# Patient Record
Sex: MALE | Race: WHITE | NOT HISPANIC OR LATINO | URBAN - METROPOLITAN AREA
[De-identification: names, ages, dates, MRNs, and addresses within clinical notes are randomized per-mention and may not be internally consistent; named-entity substitution may affect disease eponyms.]

---

## 2017-02-01 ENCOUNTER — COMMUNICATION - HEALTHEAST (OUTPATIENT)
Dept: INTERNAL MEDICINE | Facility: CLINIC | Age: 72
End: 2017-02-01

## 2017-02-01 DIAGNOSIS — I25.10 CORONARY ATHEROSCLEROSIS: ICD-10-CM

## 2017-02-08 ENCOUNTER — OFFICE VISIT - HEALTHEAST (OUTPATIENT)
Dept: INTERNAL MEDICINE | Facility: CLINIC | Age: 72
End: 2017-02-08

## 2017-02-08 DIAGNOSIS — I77.9 BILATERAL CAROTID ARTERY DISEASE (H): ICD-10-CM

## 2017-02-08 DIAGNOSIS — Z23 NEED FOR PROPHYLACTIC VACCINATION AND INOCULATION AGAINST INFLUENZA: ICD-10-CM

## 2017-02-08 DIAGNOSIS — I25.10 CORONARY ARTERY DISEASE: ICD-10-CM

## 2017-02-08 DIAGNOSIS — Z12.2 ENCOUNTER FOR SCREENING FOR LUNG CANCER: ICD-10-CM

## 2017-02-08 DIAGNOSIS — Z71.89 ADVANCED CARE PLANNING/COUNSELING DISCUSSION: ICD-10-CM

## 2017-02-08 DIAGNOSIS — Z00.00 ROUTINE GENERAL MEDICAL EXAMINATION AT A HEALTH CARE FACILITY: ICD-10-CM

## 2017-02-08 DIAGNOSIS — Z23 IMMUNIZATION DUE: ICD-10-CM

## 2017-02-08 DIAGNOSIS — Z12.11 SCREENING FOR COLON CANCER: ICD-10-CM

## 2017-02-08 DIAGNOSIS — R79.9 ABNORMAL FINDING OF BLOOD CHEMISTRY: ICD-10-CM

## 2017-02-08 DIAGNOSIS — R01.1 SYSTOLIC MURMUR: ICD-10-CM

## 2017-02-08 DIAGNOSIS — F17.211 CIGARETTE NICOTINE DEPENDENCE IN REMISSION: ICD-10-CM

## 2017-02-08 DIAGNOSIS — Z82.79 FAMILY HISTORY OF BICUSPID AORTIC VALVE: ICD-10-CM

## 2017-02-08 DIAGNOSIS — I65.23 OCCLUSION AND STENOSIS OF BILATERAL CAROTID ARTERIES: ICD-10-CM

## 2017-02-08 DIAGNOSIS — Z12.5 SCREENING FOR PROSTATE CANCER: ICD-10-CM

## 2017-02-08 DIAGNOSIS — Z13.6 SCREENING FOR ABDOMINAL AORTIC ANEURYSM: ICD-10-CM

## 2017-02-08 DIAGNOSIS — R07.9 CHEST PAIN: ICD-10-CM

## 2017-02-08 LAB
CHOLEST SERPL-MCNC: 165 MG/DL
FASTING STATUS PATIENT QL REPORTED: YES
HBA1C MFR BLD: 5.3 % (ref 3.5–6)
HDLC SERPL-MCNC: 52 MG/DL
LDLC SERPL CALC-MCNC: 96 MG/DL
PSA SERPL-MCNC: 1.8 NG/ML (ref 0–6.5)
TRIGL SERPL-MCNC: 84 MG/DL

## 2017-02-08 ASSESSMENT — MIFFLIN-ST. JEOR: SCORE: 1819.01

## 2017-02-09 ENCOUNTER — COMMUNICATION - HEALTHEAST (OUTPATIENT)
Dept: INTERNAL MEDICINE | Facility: CLINIC | Age: 72
End: 2017-02-09

## 2017-02-13 ENCOUNTER — HOSPITAL ENCOUNTER (OUTPATIENT)
Dept: ULTRASOUND IMAGING | Facility: CLINIC | Age: 72
Discharge: HOME | End: 2017-02-13
Attending: INTERNAL MEDICINE

## 2017-02-13 DIAGNOSIS — I65.23 OCCLUSION AND STENOSIS OF BILATERAL CAROTID ARTERIES: ICD-10-CM

## 2017-02-13 DIAGNOSIS — Z13.6 SCREENING FOR ABDOMINAL AORTIC ANEURYSM: ICD-10-CM

## 2017-02-13 DIAGNOSIS — I77.9 BILATERAL CAROTID ARTERY DISEASE (H): ICD-10-CM

## 2017-02-14 ENCOUNTER — COMMUNICATION - HEALTHEAST (OUTPATIENT)
Dept: INTERNAL MEDICINE | Facility: CLINIC | Age: 72
End: 2017-02-14

## 2017-03-02 ENCOUNTER — HOSPITAL ENCOUNTER (OUTPATIENT)
Dept: CARDIOLOGY | Facility: CLINIC | Age: 72
Discharge: HOME | End: 2017-03-02
Attending: INTERNAL MEDICINE

## 2017-03-02 DIAGNOSIS — R07.9 CHEST PAIN: ICD-10-CM

## 2017-03-02 DIAGNOSIS — Z82.79 FAMILY HISTORY OF BICUSPID AORTIC VALVE: ICD-10-CM

## 2017-03-02 DIAGNOSIS — R01.1 SYSTOLIC MURMUR: ICD-10-CM

## 2017-03-02 LAB
AORTIC ROOT: 3.2 CM
AORTIC VALVE MEAN VELOCITY: 134 CM/S
AV DIMENSIONLESS INDEX VTI: 0.8
AV MEAN GRADIENT: 8 MMHG
AV PEAK GRADIENT: 15.8 MMHG
AV VALVE AREA: 2.7 CM2
AV VELOCITY RATIO: 0.7
BSA FOR ECHO PROCEDURE: 2.3 M2
CV BLOOD PRESSURE: NORMAL MMHG
CV ECHO HEIGHT: 73 IN
CV ECHO WEIGHT: 226 LBS
CV STRESS CURRENT BP HE: NORMAL
CV STRESS CURRENT HR HE: 100
CV STRESS CURRENT HR HE: 102
CV STRESS CURRENT HR HE: 103
CV STRESS CURRENT HR HE: 105
CV STRESS CURRENT HR HE: 117
CV STRESS CURRENT HR HE: 122
CV STRESS CURRENT HR HE: 128
CV STRESS CURRENT HR HE: 131
CV STRESS CURRENT HR HE: 137
CV STRESS CURRENT HR HE: 137
CV STRESS CURRENT HR HE: 140
CV STRESS CURRENT HR HE: 141
CV STRESS CURRENT HR HE: 141
CV STRESS CURRENT HR HE: 48
CV STRESS CURRENT HR HE: 53
CV STRESS CURRENT HR HE: 63
CV STRESS CURRENT HR HE: 63
CV STRESS CURRENT HR HE: 64
CV STRESS CURRENT HR HE: 68
CV STRESS CURRENT HR HE: 69
CV STRESS CURRENT HR HE: 70
CV STRESS CURRENT HR HE: 71
CV STRESS CURRENT HR HE: 75
CV STRESS CURRENT HR HE: 77
CV STRESS CURRENT HR HE: 78
CV STRESS CURRENT HR HE: 79
CV STRESS CURRENT HR HE: 83
CV STRESS CURRENT HR HE: 90
CV STRESS CURRENT HR HE: 92
CV STRESS DEVIATION TIME HE: NORMAL
CV STRESS ECHO PERCENT HR HE: NORMAL
CV STRESS EXERCISE STAGE HE: NORMAL
CV STRESS FINAL RESTING BP HE: NORMAL
CV STRESS FINAL RESTING HR HE: 63
CV STRESS MAX HR HE: 142
CV STRESS MAX HR HE: 150
CV STRESS MAX TREADMILL GRADE HE: 0
CV STRESS MAX TREADMILL SPEED HE: 0
CV STRESS PEAK DIA BP HE: NORMAL
CV STRESS PEAK SYS BP HE: NORMAL
CV STRESS PHASE HE: NORMAL
CV STRESS PROTOCOL HE: NORMAL
CV STRESS RESTING PT POSITION HE: NORMAL
CV STRESS RESTING PT POSITION HE: NORMAL
CV STRESS ST DEVIATION AMOUNT HE: NORMAL
CV STRESS ST DEVIATION ELEVATION HE: NORMAL
CV STRESS ST EVELATION AMOUNT HE: NORMAL
CV STRESS TEST TYPE HE: NORMAL
CV STRESS TOTAL STAGE TIME MIN 1 HE: NORMAL
DOP CALC AO PEAK VEL: 199 CM/S
DOP CALC AO VTI: 44.5 CM
DOP CALC LVOT AREA: 3.46 CM2
DOP CALC LVOT DIAMETER: 2.1 CM
DOP CALC LVOT PEAK VEL: 149 CM/S
DOP CALC LVOT STROKE VOLUME: 122.2 CM3
DOP CALCLVOT PEAK VEL VTI: 35.3 CM
EJECTION FRACTION: 60 % (ref 55–75)
LA AREA 1: 20.5 CM2
LA AREA 2: 13.4 CM2
LEFT ATRIUM LENGTH: 4.51 CM
LEFT ATRIUM VOLUME INDEX: 22.5 ML/M2
LEFT ATRIUM VOLUME: 51.8 CM3
LEFT VENTRICLE CARDIAC INDEX: 2.8 L/MIN/M2
LEFT VENTRICLE CARDIAC OUTPUT: 6.4 L/MIN
LEFT VENTRICLE DIASTOLIC VOLUME INDEX: 38.3 CM3/M2 (ref 34–74)
LEFT VENTRICLE DIASTOLIC VOLUME: 88 CM3 (ref 62–150)
LEFT VENTRICLE HEART RATE: 52 BPM
LEFT VENTRICLE SYSTOLIC VOLUME INDEX: 15.2 CM3/M2 (ref 11–31)
LEFT VENTRICLE SYSTOLIC VOLUME: 35 CM3 (ref 21–61)
LEFT VENTRICULAR OUTFLOW TRACT MEAN GRADIENT: 5 MMHG
LEFT VENTRICULAR OUTFLOW TRACT MEAN VELOCITY: 103 CM/S
LEFT VENTRICULAR OUTFLOW TRACT PEAK GRADIENT: 9 MMHG
LV STROKE VOLUME INDEX: 53.1 ML/M2
MITRAL VALVE E/A RATIO: 0.8
MV AVERAGE E/E' RATIO: 11.1 CM/S
MV DECELERATION TIME: 246 MS
MV E'TISSUE VEL-LAT: 9.46 CM/S
MV E'TISSUE VEL-MED: 5.36 CM/S
MV LATERAL E/E' RATIO: 8.7
MV MEDIAL E/E' RATIO: 15.3
MV PEAK A VELOCITY: 106 CM/S
MV PEAK E VELOCITY: 82.1 CM/S
NUC REST DIASTOLIC VOLUME INDEX: 3616 LBS
NUC REST SYSTOLIC VOLUME INDEX: 73 IN
STRESS ECHO BASELINE BP: NORMAL
STRESS ECHO BASELINE BP: NORMAL M/S
STRESS ECHO BASELINE HR: 48
STRESS ECHO BASELINE HR: 48 BPM
STRESS ECHO CALCULATED PERCENT HR: 100 %
STRESS ECHO CALCULATED PERCENT HR: 95 %
STRESS ECHO LAST STRESS BP: NORMAL
STRESS ECHO LAST STRESS BP: NORMAL M/S
STRESS ECHO LAST STRESS HR: 140
STRESS ECHO POST ESTIMATED WORKLOAD: 10.3
STRESS ECHO POST ESTIMATED WORKLOAD: 10.3 METS
STRESS ECHO POST EXERCISE DUR MIN: 9
STRESS ECHO POST EXERCISE DUR MIN: 9 MIN
STRESS ECHO POST EXERCISE DUR SEC: 0
STRESS ECHO TARGET HR: 128
STRESS ECHO TARGET HR: 128
TRICUSPID REGURGITATION PEAK PRESSURE GRADIENT: 23.6 MMHG
TRICUSPID VALVE ANULAR PLANE SYSTOLIC EXCURSION: 2.1 CM
TRICUSPID VALVE PEAK REGURGITANT VELOCITY: 243 CM/S

## 2017-03-02 ASSESSMENT — MIFFLIN-ST. JEOR: SCORE: 1819.01

## 2017-03-03 ENCOUNTER — COMMUNICATION - HEALTHEAST (OUTPATIENT)
Dept: INTERNAL MEDICINE | Facility: CLINIC | Age: 72
End: 2017-03-03

## 2017-04-18 ENCOUNTER — OFFICE VISIT - HEALTHEAST (OUTPATIENT)
Dept: CARDIOLOGY | Facility: CLINIC | Age: 72
End: 2017-04-18

## 2017-04-18 DIAGNOSIS — I25.10 ATHEROSCLEROSIS OF NATIVE CORONARY ARTERY OF NATIVE HEART WITHOUT ANGINA PECTORIS: ICD-10-CM

## 2017-04-18 DIAGNOSIS — E78.5 HYPERLIPEMIA: ICD-10-CM

## 2017-04-18 DIAGNOSIS — G47.33 OSA ON CPAP: ICD-10-CM

## 2017-04-18 ASSESSMENT — MIFFLIN-ST. JEOR: SCORE: 1809.94

## 2017-04-20 ENCOUNTER — COMMUNICATION - HEALTHEAST (OUTPATIENT)
Dept: INTERNAL MEDICINE | Facility: CLINIC | Age: 72
End: 2017-04-20

## 2017-04-20 DIAGNOSIS — I25.10 CORONARY ATHEROSCLEROSIS: ICD-10-CM

## 2017-08-17 ENCOUNTER — COMMUNICATION - HEALTHEAST (OUTPATIENT)
Dept: INTERNAL MEDICINE | Facility: CLINIC | Age: 72
End: 2017-08-17

## 2017-08-17 DIAGNOSIS — I25.10 CORONARY ATHEROSCLEROSIS: ICD-10-CM

## 2017-09-27 ENCOUNTER — HOSPITAL ENCOUNTER (OUTPATIENT)
Dept: LAB | Age: 72
Setting detail: SPECIMEN
Discharge: HOME | End: 2017-09-27

## 2017-09-27 ENCOUNTER — AMBULATORY - HEALTHEAST (OUTPATIENT)
Dept: CARDIOLOGY | Facility: CLINIC | Age: 72
End: 2017-09-27

## 2017-09-27 DIAGNOSIS — I25.10 ATHEROSCLEROSIS OF NATIVE CORONARY ARTERY OF NATIVE HEART WITHOUT ANGINA PECTORIS: ICD-10-CM

## 2017-09-27 LAB
CHOLEST SERPL-MCNC: 173 MG/DL
FASTING STATUS PATIENT QL REPORTED: YES
HDLC SERPL-MCNC: 49 MG/DL
LDLC SERPL CALC-MCNC: 99 MG/DL
TRIGL SERPL-MCNC: 124 MG/DL

## 2018-02-21 ENCOUNTER — COMMUNICATION - HEALTHEAST (OUTPATIENT)
Dept: CARDIOLOGY | Facility: CLINIC | Age: 73
End: 2018-02-21

## 2018-02-21 DIAGNOSIS — E78.5 HYPERLIPEMIA: ICD-10-CM

## 2018-02-22 ENCOUNTER — COMMUNICATION - HEALTHEAST (OUTPATIENT)
Dept: CARDIOLOGY | Facility: CLINIC | Age: 73
End: 2018-02-22

## 2018-02-22 DIAGNOSIS — I25.10 CAD (CORONARY ARTERY DISEASE): ICD-10-CM

## 2018-04-16 ENCOUNTER — COMMUNICATION - HEALTHEAST (OUTPATIENT)
Dept: CARDIOLOGY | Facility: CLINIC | Age: 73
End: 2018-04-16

## 2018-04-16 DIAGNOSIS — I25.10 CAD (CORONARY ARTERY DISEASE): ICD-10-CM

## 2018-05-04 ENCOUNTER — COMMUNICATION - HEALTHEAST (OUTPATIENT)
Dept: ADMINISTRATIVE | Facility: CLINIC | Age: 73
End: 2018-05-04

## 2018-09-18 ENCOUNTER — COMMUNICATION - HEALTHEAST (OUTPATIENT)
Dept: TELEHEALTH | Facility: CLINIC | Age: 73
End: 2018-09-18

## 2018-09-19 ENCOUNTER — OFFICE VISIT - HEALTHEAST (OUTPATIENT)
Dept: CARDIOLOGY | Facility: TELEHEALTH | Age: 73
End: 2018-09-19

## 2018-09-19 DIAGNOSIS — G47.33 OSA ON CPAP: ICD-10-CM

## 2018-09-19 DIAGNOSIS — I25.10 ATHEROSCLEROSIS OF NATIVE CORONARY ARTERY OF NATIVE HEART WITHOUT ANGINA PECTORIS: ICD-10-CM

## 2018-09-19 ASSESSMENT — MIFFLIN-ST. JEOR: SCORE: 1800.87

## 2018-10-11 ENCOUNTER — COMMUNICATION - HEALTHEAST (OUTPATIENT)
Dept: INTERNAL MEDICINE | Facility: CLINIC | Age: 73
End: 2018-10-11

## 2018-10-11 DIAGNOSIS — I25.10 CORONARY ATHEROSCLEROSIS: ICD-10-CM

## 2018-11-20 ENCOUNTER — COMMUNICATION - HEALTHEAST (OUTPATIENT)
Dept: CARDIOLOGY | Facility: CLINIC | Age: 73
End: 2018-11-20

## 2018-11-20 DIAGNOSIS — I25.10 CAD (CORONARY ARTERY DISEASE): ICD-10-CM

## 2019-05-19 ENCOUNTER — COMMUNICATION - HEALTHEAST (OUTPATIENT)
Dept: CARDIOLOGY | Facility: CLINIC | Age: 74
End: 2019-05-19

## 2019-05-19 DIAGNOSIS — I25.10 CAD (CORONARY ARTERY DISEASE): ICD-10-CM

## 2019-05-21 ENCOUNTER — OFFICE VISIT - HEALTHEAST (OUTPATIENT)
Dept: INTERNAL MEDICINE | Facility: CLINIC | Age: 74
End: 2019-05-21

## 2019-05-21 DIAGNOSIS — R07.9 CHEST PAIN: ICD-10-CM

## 2019-05-21 DIAGNOSIS — Z00.00 ROUTINE GENERAL MEDICAL EXAMINATION AT A HEALTH CARE FACILITY: ICD-10-CM

## 2019-05-21 DIAGNOSIS — I25.10 ATHEROSCLEROSIS OF NATIVE CORONARY ARTERY OF NATIVE HEART WITHOUT ANGINA PECTORIS: ICD-10-CM

## 2019-05-21 DIAGNOSIS — K21.9 GASTROESOPHAGEAL REFLUX DISEASE WITHOUT ESOPHAGITIS: ICD-10-CM

## 2019-05-21 DIAGNOSIS — R42 POSTURAL LIGHTHEADEDNESS: ICD-10-CM

## 2019-05-21 DIAGNOSIS — G47.33 OSA ON CPAP: ICD-10-CM

## 2019-05-21 DIAGNOSIS — G62.9 POLYNEUROPATHY: ICD-10-CM

## 2019-05-21 DIAGNOSIS — E78.5 HYPERLIPIDEMIA, UNSPECIFIED HYPERLIPIDEMIA TYPE: ICD-10-CM

## 2019-05-21 DIAGNOSIS — Z71.89 ADVANCED CARE PLANNING/COUNSELING DISCUSSION: ICD-10-CM

## 2019-05-21 DIAGNOSIS — Z13.1 SCREENING FOR DIABETES MELLITUS: ICD-10-CM

## 2019-05-21 DIAGNOSIS — I77.9 BILATERAL CAROTID ARTERY DISEASE, UNSPECIFIED TYPE (H): ICD-10-CM

## 2019-05-21 DIAGNOSIS — R29.2 HYPERREFLEXIA: ICD-10-CM

## 2019-05-21 DIAGNOSIS — R26.89 POOR BALANCE: ICD-10-CM

## 2019-05-21 DIAGNOSIS — G47.10 EXCESSIVE SLEEPINESS: ICD-10-CM

## 2019-05-21 DIAGNOSIS — Z12.11 SCREEN FOR COLON CANCER: ICD-10-CM

## 2019-05-21 DIAGNOSIS — Z12.5 SCREENING FOR PROSTATE CANCER: ICD-10-CM

## 2019-05-21 LAB
ALBUMIN SERPL-MCNC: 3.7 G/DL (ref 3.5–5)
ALBUMIN UR-MCNC: ABNORMAL MG/DL
ALP SERPL-CCNC: 98 U/L (ref 45–120)
ALT SERPL W P-5'-P-CCNC: 27 U/L (ref 0–45)
ANION GAP SERPL CALCULATED.3IONS-SCNC: 9 MMOL/L (ref 5–18)
APPEARANCE UR: CLEAR
AST SERPL W P-5'-P-CCNC: 22 U/L (ref 0–40)
ATRIAL RATE - MUSE: 46 BPM
BACTERIA #/AREA URNS HPF: ABNORMAL HPF
BILIRUB SERPL-MCNC: 0.9 MG/DL (ref 0–1)
BILIRUB UR QL STRIP: NEGATIVE
BUN SERPL-MCNC: 21 MG/DL (ref 8–28)
CALCIUM SERPL-MCNC: 9.9 MG/DL (ref 8.5–10.5)
CHLORIDE BLD-SCNC: 108 MMOL/L (ref 98–107)
CHOLEST SERPL-MCNC: 139 MG/DL
CO2 SERPL-SCNC: 24 MMOL/L (ref 22–31)
COLOR UR AUTO: YELLOW
CREAT SERPL-MCNC: 0.8 MG/DL (ref 0.7–1.3)
DIASTOLIC BLOOD PRESSURE - MUSE: NORMAL MMHG
ERYTHROCYTE [DISTWIDTH] IN BLOOD BY AUTOMATED COUNT: 13 % (ref 11–14.5)
FASTING STATUS PATIENT QL REPORTED: YES
GFR SERPL CREATININE-BSD FRML MDRD: >60 ML/MIN/1.73M2
GLUCOSE BLD-MCNC: 88 MG/DL (ref 70–125)
GLUCOSE UR STRIP-MCNC: NEGATIVE MG/DL
HCT VFR BLD AUTO: 46 % (ref 40–54)
HDLC SERPL-MCNC: 50 MG/DL
HGB BLD-MCNC: 15.5 G/DL (ref 14–18)
HGB UR QL STRIP: ABNORMAL
INTERPRETATION ECG - MUSE: NORMAL
KETONES UR STRIP-MCNC: NEGATIVE MG/DL
LDLC SERPL CALC-MCNC: 74 MG/DL
LEUKOCYTE ESTERASE UR QL STRIP: NEGATIVE
MCH RBC QN AUTO: 30.3 PG (ref 27–34)
MCHC RBC AUTO-ENTMCNC: 33.6 G/DL (ref 32–36)
MCV RBC AUTO: 90 FL (ref 80–100)
MUCOUS THREADS #/AREA URNS LPF: ABNORMAL LPF
NITRATE UR QL: NEGATIVE
P AXIS - MUSE: 72 DEGREES
PH UR STRIP: 5.5 [PH] (ref 4.5–8)
PLATELET # BLD AUTO: 195 THOU/UL (ref 140–440)
PMV BLD AUTO: 8.2 FL (ref 7–10)
POTASSIUM BLD-SCNC: 4.4 MMOL/L (ref 3.5–5)
PR INTERVAL - MUSE: 178 MS
PROT SERPL-MCNC: 6.6 G/DL (ref 6–8)
PSA SERPL-MCNC: 1.4 NG/ML (ref 0–6.5)
QRS DURATION - MUSE: 80 MS
QT - MUSE: 474 MS
QTC - MUSE: 414 MS
R AXIS - MUSE: -6 DEGREES
RBC # BLD AUTO: 5.1 MILL/UL (ref 4.4–6.2)
RBC #/AREA URNS AUTO: ABNORMAL HPF
SODIUM SERPL-SCNC: 141 MMOL/L (ref 136–145)
SP GR UR STRIP: 1.03 (ref 1–1.03)
SQUAMOUS #/AREA URNS AUTO: ABNORMAL LPF
SYSTOLIC BLOOD PRESSURE - MUSE: NORMAL MMHG
T AXIS - MUSE: 46 DEGREES
TRIGL SERPL-MCNC: 75 MG/DL
TSH SERPL DL<=0.005 MIU/L-ACNC: 1.2 UIU/ML (ref 0.3–5)
UROBILINOGEN UR STRIP-ACNC: ABNORMAL
VENTRICULAR RATE- MUSE: 46 BPM
VIT B12 SERPL-MCNC: 234 PG/ML (ref 213–816)
WBC #/AREA URNS AUTO: ABNORMAL HPF
WBC: 6 THOU/UL (ref 4–11)

## 2019-05-21 ASSESSMENT — MIFFLIN-ST. JEOR: SCORE: 1828.08

## 2019-05-24 LAB
ALBUMIN PERCENT: 62.5 % (ref 51–67)
ALBUMIN SERPL ELPH-MCNC: 3.9 G/DL (ref 3.2–4.7)
ALPHA 1 PERCENT: 2.2 % (ref 2–4)
ALPHA 2 PERCENT: 11 % (ref 5–13)
ALPHA1 GLOB SERPL ELPH-MCNC: 0.1 G/DL (ref 0.1–0.3)
ALPHA2 GLOB SERPL ELPH-MCNC: 0.7 G/DL (ref 0.4–0.9)
B-GLOBULIN SERPL ELPH-MCNC: 0.8 G/DL (ref 0.7–1.2)
BETA PERCENT: 13.4 % (ref 10–17)
GAMMA GLOB SERPL ELPH-MCNC: 0.7 G/DL (ref 0.6–1.4)
GAMMA GLOBULIN PERCENT: 10.9 % (ref 9–20)
PATH ICD:: NORMAL
PROT PATTERN SERPL ELPH-IMP: NORMAL
PROT SERPL-MCNC: 6.3 G/DL (ref 6–8)
REVIEWING PATHOLOGIST: NORMAL

## 2019-05-31 ENCOUNTER — HOSPITAL ENCOUNTER (OUTPATIENT)
Dept: MRI IMAGING | Facility: CLINIC | Age: 74
Discharge: HOME | End: 2019-05-31
Attending: INTERNAL MEDICINE

## 2019-05-31 DIAGNOSIS — G62.9 POLYNEUROPATHY: ICD-10-CM

## 2019-05-31 DIAGNOSIS — R26.89 POOR BALANCE: ICD-10-CM

## 2019-05-31 DIAGNOSIS — R29.2 HYPERREFLEXIA: ICD-10-CM

## 2019-06-05 ENCOUNTER — AMBULATORY - HEALTHEAST (OUTPATIENT)
Dept: INTERNAL MEDICINE | Facility: CLINIC | Age: 74
End: 2019-06-05

## 2019-06-05 DIAGNOSIS — G62.9 POLYNEUROPATHY: ICD-10-CM

## 2019-06-05 DIAGNOSIS — R26.89 POOR BALANCE: ICD-10-CM

## 2019-06-05 DIAGNOSIS — R29.2 HYPERREFLEXIA: ICD-10-CM

## 2019-06-13 ENCOUNTER — COMMUNICATION - HEALTHEAST (OUTPATIENT)
Dept: INTERNAL MEDICINE | Facility: CLINIC | Age: 74
End: 2019-06-13

## 2019-07-09 ENCOUNTER — HOSPITAL ENCOUNTER (OUTPATIENT)
Dept: CT IMAGING | Facility: CLINIC | Age: 74
Discharge: HOME | End: 2019-07-09
Attending: INTERNAL MEDICINE

## 2019-07-10 ENCOUNTER — RECORDS - HEALTHEAST (OUTPATIENT)
Dept: ADMINISTRATIVE | Facility: OTHER | Age: 74
End: 2019-07-10

## 2019-09-05 ENCOUNTER — COMMUNICATION - HEALTHEAST (OUTPATIENT)
Dept: INTERNAL MEDICINE | Facility: CLINIC | Age: 74
End: 2019-09-05

## 2019-09-19 ENCOUNTER — HOSPITAL ENCOUNTER (OUTPATIENT)
Dept: CT IMAGING | Facility: CLINIC | Age: 74
Discharge: HOME | End: 2019-09-19
Attending: INTERNAL MEDICINE

## 2019-09-19 DIAGNOSIS — R07.9 CHEST PAIN: ICD-10-CM

## 2019-09-19 LAB
BSA FOR ECHO PROCEDURE: 2.31 M2
CREAT BLD-MCNC: 0.9 MG/DL (ref 0.7–1.3)
GFR SERPL CREATININE-BSD FRML MDRD: >60 ML/MIN/1.73M2
LEFT VENTRICLE HEART RATE: 45 BPM

## 2019-09-19 ASSESSMENT — MIFFLIN-ST. JEOR: SCORE: 1828.08

## 2019-09-20 ENCOUNTER — COMMUNICATION - HEALTHEAST (OUTPATIENT)
Dept: ADMINISTRATIVE | Facility: CLINIC | Age: 74
End: 2019-09-20

## 2019-09-20 ENCOUNTER — AMBULATORY - HEALTHEAST (OUTPATIENT)
Dept: INTERNAL MEDICINE | Facility: CLINIC | Age: 74
End: 2019-09-20

## 2019-09-20 DIAGNOSIS — R91.8 PULMONARY NODULES: ICD-10-CM

## 2019-09-30 ENCOUNTER — COMMUNICATION - HEALTHEAST (OUTPATIENT)
Dept: CARDIOLOGY | Facility: TELEHEALTH | Age: 74
End: 2019-09-30

## 2019-10-02 ENCOUNTER — COMMUNICATION - HEALTHEAST (OUTPATIENT)
Dept: CARDIOLOGY | Facility: CLINIC | Age: 74
End: 2019-10-02

## 2019-10-03 ENCOUNTER — COMMUNICATION - HEALTHEAST (OUTPATIENT)
Dept: INTERNAL MEDICINE | Facility: CLINIC | Age: 74
End: 2019-10-03

## 2019-10-03 ENCOUNTER — OFFICE VISIT - HEALTHEAST (OUTPATIENT)
Dept: PULMONOLOGY | Facility: OTHER | Age: 74
End: 2019-10-03

## 2019-10-03 DIAGNOSIS — R05.9 COUGH: ICD-10-CM

## 2019-10-03 DIAGNOSIS — R91.8 PULMONARY NODULES: ICD-10-CM

## 2019-10-03 ASSESSMENT — MIFFLIN-ST. JEOR: SCORE: 1814.47

## 2019-10-11 ENCOUNTER — HOSPITAL ENCOUNTER (OUTPATIENT)
Dept: CT IMAGING | Facility: CLINIC | Age: 74
Discharge: HOME | End: 2019-10-11
Attending: INTERNAL MEDICINE

## 2019-10-11 DIAGNOSIS — R91.8 PULMONARY NODULES: ICD-10-CM

## 2019-10-16 ENCOUNTER — COMMUNICATION - HEALTHEAST (OUTPATIENT)
Dept: PULMONOLOGY | Facility: OTHER | Age: 74
End: 2019-10-16

## 2019-10-16 DIAGNOSIS — R91.8 PULMONARY NODULES: ICD-10-CM

## 2019-12-27 ENCOUNTER — COMMUNICATION - HEALTHEAST (OUTPATIENT)
Dept: CARDIOLOGY | Facility: CLINIC | Age: 74
End: 2019-12-27

## 2019-12-27 DIAGNOSIS — I25.10 CAD (CORONARY ARTERY DISEASE): ICD-10-CM

## 2020-01-14 ENCOUNTER — COMMUNICATION - HEALTHEAST (OUTPATIENT)
Dept: CARDIOLOGY | Facility: CLINIC | Age: 75
End: 2020-01-14

## 2020-01-14 DIAGNOSIS — I25.10 CAD (CORONARY ARTERY DISEASE): ICD-10-CM

## 2020-04-16 ENCOUNTER — OFFICE VISIT - HEALTHEAST (OUTPATIENT)
Dept: PULMONOLOGY | Facility: OTHER | Age: 75
End: 2020-04-16

## 2020-04-16 DIAGNOSIS — R05.3 CHRONIC COUGH: ICD-10-CM

## 2020-04-16 ASSESSMENT — MIFFLIN-ST. JEOR: SCORE: 1837.15

## 2020-04-30 ENCOUNTER — COMMUNICATION - HEALTHEAST (OUTPATIENT)
Dept: CARDIOLOGY | Facility: CLINIC | Age: 75
End: 2020-04-30

## 2020-04-30 DIAGNOSIS — I25.10 CAD (CORONARY ARTERY DISEASE): ICD-10-CM

## 2020-05-05 ENCOUNTER — COMMUNICATION - HEALTHEAST (OUTPATIENT)
Dept: CARDIOLOGY | Facility: CLINIC | Age: 75
End: 2020-05-05

## 2020-05-05 DIAGNOSIS — I25.10 CAD (CORONARY ARTERY DISEASE): ICD-10-CM

## 2020-05-06 ENCOUNTER — OFFICE VISIT - HEALTHEAST (OUTPATIENT)
Dept: CARDIOLOGY | Facility: TELEHEALTH | Age: 75
End: 2020-05-06

## 2020-05-06 DIAGNOSIS — G47.33 OSA ON CPAP: ICD-10-CM

## 2020-05-06 DIAGNOSIS — R07.2 PRECORDIAL PAIN: ICD-10-CM

## 2020-05-06 DIAGNOSIS — E78.5 HYPERLIPIDEMIA, UNSPECIFIED HYPERLIPIDEMIA TYPE: ICD-10-CM

## 2020-05-06 DIAGNOSIS — I25.10 ATHEROSCLEROSIS OF NATIVE CORONARY ARTERY OF NATIVE HEART WITHOUT ANGINA PECTORIS: ICD-10-CM

## 2020-05-06 DIAGNOSIS — I25.10 CAD (CORONARY ARTERY DISEASE): ICD-10-CM

## 2020-05-06 DIAGNOSIS — I77.9 BILATERAL CAROTID ARTERY DISEASE, UNSPECIFIED TYPE (H): ICD-10-CM

## 2020-05-06 RX ORDER — SILDENAFIL 50 MG/1
50 TABLET, FILM COATED ORAL DAILY PRN
Qty: 10 TABLET | Refills: 5 | Status: SHIPPED | OUTPATIENT
Start: 2020-05-06

## 2020-07-07 ENCOUNTER — COMMUNICATION - HEALTHEAST (OUTPATIENT)
Dept: INTERNAL MEDICINE | Facility: CLINIC | Age: 75
End: 2020-07-07

## 2020-09-10 ENCOUNTER — COMMUNICATION - HEALTHEAST (OUTPATIENT)
Dept: PULMONOLOGY | Facility: OTHER | Age: 75
End: 2020-09-10

## 2020-10-08 ENCOUNTER — COMMUNICATION - HEALTHEAST (OUTPATIENT)
Dept: PULMONOLOGY | Facility: OTHER | Age: 75
End: 2020-10-08

## 2020-11-05 ENCOUNTER — COMMUNICATION - HEALTHEAST (OUTPATIENT)
Dept: PULMONOLOGY | Facility: OTHER | Age: 75
End: 2020-11-05

## 2020-12-03 ENCOUNTER — COMMUNICATION - HEALTHEAST (OUTPATIENT)
Dept: PULMONOLOGY | Facility: OTHER | Age: 75
End: 2020-12-03

## 2020-12-11 ENCOUNTER — COMMUNICATION - HEALTHEAST (OUTPATIENT)
Dept: INTERNAL MEDICINE | Facility: CLINIC | Age: 75
End: 2020-12-11

## 2020-12-12 ENCOUNTER — VIRTUAL VISIT (OUTPATIENT)
Dept: FAMILY MEDICINE | Facility: OTHER | Age: 75
End: 2020-12-12

## 2020-12-12 ENCOUNTER — AMBULATORY - HEALTHEAST (OUTPATIENT)
Dept: FAMILY MEDICINE | Facility: CLINIC | Age: 75
End: 2020-12-12

## 2020-12-12 DIAGNOSIS — Z20.822 SUSPECTED COVID-19 VIRUS INFECTION: ICD-10-CM

## 2020-12-13 ENCOUNTER — COMMUNICATION - HEALTHEAST (OUTPATIENT)
Dept: EMERGENCY MEDICINE | Facility: CLINIC | Age: 75
End: 2020-12-13

## 2020-12-13 NOTE — PROGRESS NOTES
"Date: 2020 07:57:43  Clinician: Orly Miller  Clinician NPI: [id number]  Patient: Wilfrid Wyman  Patient : 1945  Patient Address: [address], [place] [address]  Patient Phone: [phone]  Visit Protocol: URI  Patient Summary:  Wilfrid is a 75 year old ( : 1945 ) male who initiated a [facility] Visit for COVID-19 (Coronavirus) evaluation and screening. When asked the question \"Please sign me up to receive news, health information and promotions. \", Wilfrid responded \"Yes\".    Wilfrid states his symptoms started gradually 2-3 weeks ago. After his symptoms started, they improved and then got worse again.   His symptoms consist of a headache, a cough, nasal congestion, rhinitis, myalgia, chills, malaise, and a sore throat. He is experiencing mild difficulty breathing with activities but can speak normally in full sentences. Wilfrid also feels feverish but was unable to measure his temperature.   Symptom details     Nasal secretions: The color of his mucus is clear.    Cough: Wilfrid coughs every 5-10 minutes and his cough is more bothersome at night. Phlegm comes into his throat when he coughs. He does not believe his cough is caused by post-nasal drip. The color of the phlegm is clear.     Sore throat: Wilfrid reports having mild throat pain (1-3 on a 10 point pain scale), does not have exudate on his tonsils, and can swallow liquids. He is not sure if the lymph nodes in his neck are enlarged. A rash has not appeared on the skin since the sore throat started.     Headache: He states the headache is mild (1-3 on a 10 point pain scale).      Wilfrid denies having ear pain, wheezing, nausea, vomiting, facial pain or pressure, teeth pain, ageusia, diarrhea, and anosmia. He also denies taking antibiotic medication in the past month, having recent facial or sinus surgery in the past 60 days, and having a sinus infection within the past year.   Precipitating events  Within the past week, Wilfrid has " not been exposed to someone with strep throat. He has not recently been exposed to someone with influenza. Wilfrid has not been in close contact with any high risk individuals.   Pertinent COVID-19 (Coronavirus) information  Wilfrid does not work or volunteer as healthcare worker or a . In the past 14 days, Wilfrid has not worked or volunteered at a healthcare facility or group living setting.   In the past 14 days, he also has not lived in a congregate living setting.   Wilfrid has not had a close contact with a laboratory-confirmed COVID-19 patient within 14 days of symptom onset.    Since December 2019, Wilfrid has not been tested for COVID-19 and has not had upper respiratory infection or influenza-like illness.   Triage Point(s) temporarily suspended for COVID-19 (Coronavirus) screening  Wilfrid reported the following symptoms/conditions. These are protocol referral points that have temporarily been removed for purposes of COVID-19 (Coronavirus) screening.   Congestive heart failure   Pertinent medical history  He has not been told by his provider to avoid NSAIDs.   Wilfrid does not have diabetes. He denies having immunosuppressive conditions (e.g., chemotherapy, HIV, organ transplant, long-term use of steroids or other immunosuppressive medications, splenectomy). He does not have severe COPD. He does not have asthma.   Wilfrid does not need a return to work/school note.   Weight: 219 lbs   Wilfrid does not smoke or use smokeless tobacco.   Additional information as reported by the patient (free text): I take statins   Weight: 219 lbs    MEDICATIONS: No current medications, ALLERGIES: NKDA  Clinician Response:  Dear Wilfrid,   Your symptoms show that you may have coronavirus (COVID-19). This illness can cause fever, cough and trouble breathing. Many people get a mild case and get better on their own. Some people can get very sick.  What should I do?  We would like to test you for this virus.   1. Please  "call [phone] to schedule your visit. Explain that you were referred by [facility] to have a COVID-19 test. Be ready to share your [facility] visit ID number.  * If you need to schedule in [facility] please call [phone] or for [employer] employees please call [phone].  * If you need to schedule in the [place] area please call [phone]. [place] employees call [phone].  The following will serve as your written order for this COVID Test, ordered by me, for the indication of suspected COVID [Z20.828]: The test will be ordered in [employer], our electronic health record, after you are scheduled. It will show as ordered and authorized by Wilfrid Westfall MD.  Order: COVID-19 (Coronavirus) PCR for SYMPTOMATIC testing from [facility].   2. When it's time for your COVID test:  Stay at least 6 feet away from others. (If someone will drive you to your test, stay in the backseat, as far away from the  as you can.)   Cover your mouth and nose with a mask, tissue or washcloth.  Go straight to the testing site. Don't make any stops on the way there or back.      3.Starting now: Stay home and away from others (self-isolate) until:   You've had no fever---and no medicine that reduces fever---for one full day (24 hours). And...   Your other symptoms have gotten better. For example, your cough or breathing has improved. And...   At least 10 days have passed since your symptoms started.       During this time, don't leave the house except for testing or medical care.   Stay in your own room, even for meals. Use your own bathroom if you can.   Stay away from others in your home. No hugging, kissing or shaking hands. No visitors.  Don't go to work, school or anywhere else.    Clean \"high touch\" surfaces often (doorknobs, counters, handles, etc.). Use a household cleaning spray or wipes. You'll find a full list of  on the EPA website: www.epa.gov/pesticide-registration/list-n-disinfectants-use-against-sars-cov-2.   Cover " your mouth and nose with a mask, tissue or washcloth to avoid spreading germs.  Wash your hands and face often. Use soap and water.  Caregivers in these groups are at risk for severe illness due to COVID-19:  o People 65 years and older  o People who live in a nursing home or long-term care facility  o People with chronic disease (lung, heart, cancer, diabetes, kidney, liver, immunologic)  o People who have a weakened immune system, including those who:   Are in cancer treatment  Take medicine that weakens the immune system, such as corticosteroids  Had a bone marrow or organ transplant  Have an immune deficiency  Have poorly controlled HIV or AIDS  Are obese (body mass index of 40 or higher)  Smoke regularly   o Caregivers should wear gloves while washing dishes, handling laundry and cleaning bedrooms and bathrooms.  o Use caution when washing and drying laundry: Don't shake dirty laundry, and use the warmest water setting that you can.  o For more tips, go to www.cdc.gov/coronavirus/2019-ncov/downloads/10Things.pdf.       How can I take care of myself?   Get lots of rest. Drink extra fluids (unless a doctor has told you not to).   Take Tylenol (acetaminophen) for fever or pain. If you have liver or kidney problems, ask your family doctor if it's okay to take Tylenol.   Adults can take either:    650 mg (two 325 mg pills) every 4 to 6 hours, or...   1,000 mg (two 500 mg pills) every 8 hours as needed.    Note: Don't take more than 3,000 mg in one day. Acetaminophen is found in many medicines (both prescribed and over-the-counter medicines). Read all labels to be sure you don't take too much.   For children, check the Tylenol bottle for the right dose. The dose is based on the child's age or weight.    If you have other health problems (like cancer, heart failure, an organ transplant or severe kidney disease): Call your specialty clinic if you don't feel better in the next 2 days.       Know when to call 911.  Emergency warning signs include:    Trouble breathing or shortness of breath Pain or pressure in the chest that doesn't go away Feeling confused like you haven't felt before, or not being able to wake up Bluish-colored lips or face.  Where can I get more information?   [facility] -- About COVID-19: [email or web address]   CDC -- What to Do If You're Sick: www.cdc.gov/coronavirus/2019-ncov/about/steps-when-sick.html   CDC -- Ending Home Isolation: www.cdc.gov/coronavirus/2019-ncov/hcp/disposition-in-home-patients.html   [facility] -- Caring for Someone: www.cdc.gov/coronavirus/2019-ncov/if-you-are-sick/care-for-someone.html   [facility] -- Interim Guidance for Hospital Discharge to Home: www.health.[facility].[place]./diseases/coronavirus/hcp/hospdischarge.pdf   [facility] clinical trials (COVID-19 research studies): clinicalaffairs.[facility]-clinical-trials    Below are the COVID-19 hotlines at the [place] Department of Health ([facility]). Interpreters are available.    For health questions: Call 917-920-7861 or 1-832.922.1297 (7 a.m. to 7 p.m.) For questions about schools and childcare: Call 137-935-7832 or 1-580.538.1543 (7 a.m. to 7 p.m.)    Diagnosis: Contact with and (suspected) exposure to other viral communicable diseases  Diagnosis ICD: Z20.828

## 2020-12-14 ENCOUNTER — COMMUNICATION - HEALTHEAST (OUTPATIENT)
Dept: CARDIOLOGY | Facility: CLINIC | Age: 75
End: 2020-12-14

## 2020-12-14 ENCOUNTER — COMMUNICATION - HEALTHEAST (OUTPATIENT)
Dept: SCHEDULING | Facility: CLINIC | Age: 75
End: 2020-12-14

## 2020-12-15 ENCOUNTER — COMMUNICATION - HEALTHEAST (OUTPATIENT)
Dept: INTERNAL MEDICINE | Facility: CLINIC | Age: 75
End: 2020-12-15

## 2020-12-16 ENCOUNTER — OFFICE VISIT - HEALTHEAST (OUTPATIENT)
Dept: INTERNAL MEDICINE | Facility: CLINIC | Age: 75
End: 2020-12-16

## 2020-12-16 ENCOUNTER — COMMUNICATION - HEALTHEAST (OUTPATIENT)
Dept: PULMONOLOGY | Facility: OTHER | Age: 75
End: 2020-12-16

## 2020-12-16 DIAGNOSIS — F51.01 PRIMARY INSOMNIA: ICD-10-CM

## 2020-12-16 DIAGNOSIS — E78.00 PURE HYPERCHOLESTEROLEMIA: ICD-10-CM

## 2020-12-16 DIAGNOSIS — U07.1 CLINICAL DIAGNOSIS OF COVID-19: ICD-10-CM

## 2020-12-16 DIAGNOSIS — I25.10 ATHEROSCLEROSIS OF NATIVE CORONARY ARTERY OF NATIVE HEART WITHOUT ANGINA PECTORIS: ICD-10-CM

## 2020-12-16 RX ORDER — ALPRAZOLAM 0.5 MG
0.5 TABLET ORAL
Qty: 20 TABLET | Refills: 0 | Status: SHIPPED | OUTPATIENT
Start: 2020-12-16 | End: 2023-03-15

## 2021-02-05 ENCOUNTER — HOSPITAL ENCOUNTER (OUTPATIENT)
Dept: CT IMAGING | Facility: CLINIC | Age: 76
Discharge: HOME | End: 2021-02-05
Attending: INTERNAL MEDICINE

## 2021-02-05 DIAGNOSIS — R91.8 PULMONARY NODULES: ICD-10-CM

## 2021-02-07 ENCOUNTER — COMMUNICATION - HEALTHEAST (OUTPATIENT)
Dept: INTENSIVE CARE | Facility: CLINIC | Age: 76
End: 2021-02-07

## 2021-02-16 ENCOUNTER — COMMUNICATION - HEALTHEAST (OUTPATIENT)
Dept: PULMONOLOGY | Facility: OTHER | Age: 76
End: 2021-02-16

## 2021-05-25 ENCOUNTER — COMMUNICATION - HEALTHEAST (OUTPATIENT)
Dept: CARDIOLOGY | Facility: TELEHEALTH | Age: 76
End: 2021-05-25

## 2021-05-25 DIAGNOSIS — I25.10 CAD (CORONARY ARTERY DISEASE): ICD-10-CM

## 2021-05-26 ENCOUNTER — RECORDS - HEALTHEAST (OUTPATIENT)
Dept: ADMINISTRATIVE | Facility: CLINIC | Age: 76
End: 2021-05-26

## 2021-05-26 RX ORDER — ATORVASTATIN CALCIUM 80 MG/1
TABLET, FILM COATED ORAL
Qty: 90 TABLET | Refills: 3 | Status: SHIPPED | OUTPATIENT
Start: 2021-05-26 | End: 2022-08-17

## 2021-05-29 NOTE — PROGRESS NOTES
Assessment and Plan:   1. Routine general medical examination at a health care facility  This is a 73-year-old man with issues as discussed below    2. Screen for colon cancer  History of polyps  - Ambulatory referral for Colonoscopy    3. Screening for diabetes mellitus    4. Screening for prostate cancer  - PSA (Prostatic-Specific Antigen), Annual Screen    5. Chest pain  Ongoing for a year with some atypical features.  Certainly has cardiovascular risk factors.  He had a stress echocardiogram which looked okay 1 year ago but did have some ST depression on EKG.  Given his ongoing symptoms and risk factors I recommended a CT angiogram.  Have also messages his cardiologist Dr. Ish Valdes.  Continue secondary prevention  - Electrocardiogram Perform and Read  - CTA Coronary; Future    6. Atherosclerosis of native coronary artery of native heart without angina pectoris  - HM2(CBC w/o Differential)  - Lipid Cascade  - Comprehensive Metabolic Panel    7. Hyperlipidemia, unspecified hyperlipidemia type  - Thyroid Cascade    8. Bilateral carotid artery disease, unspecified type (H)  9. Postural lightheadedness  Consider repeat carotid ultrasounds pending above evaluation    10. Gastroesophageal reflux disease without esophagitis  - omeprazole (PRILOSEC) 20 MG capsule; TAKE 1 CAPSULE BY MOUTH DAILY  Dispense: 90 capsule; Refill: 5    11. WESLEY on CPAP - 5cm H20  - Ambulatory referral to Sleep Medicine    12. Excessive sleepiness  I recommended he follow-up with sleep medicine to see if his CPAP needs adjustment    13. Polyneuropathy  Etiology of his leg numbness uncertain but concerning for cervical spine pathology given his exam and history  - Vitamin B12  - Electrophoresis, Protein, Serum, Cascade  - MR Cervical Spine Without Contrast; Future  - Urinalysis-UC if Indicated  - EMG- Both Legs; Future    14. Poor balance  - MR Cervical Spine Without Contrast; Future  - EMG- Both Legs; Future    15. Hyperreflexia  - MR  Cervical Spine Without Contrast; Future    16. Advanced care planning/counseling discussion  Advanced Care Planning discussed today: yes, son Francisco would be medical decision maker, full code, does not want heroic life-sustaining measures if no chance of recovery, 16 minutes spent discussing today    The patient's current medical problems were reviewed.    I have had an Advance Directives discussion with the patient.  The following health maintenance schedule was reviewed with the patient and provided in printed form in the after visit summary:   Health Maintenance   Topic Date Due     COLONOSCOPY  10/01/2014     ZOSTER VACCINES (2 of 3) 04/05/2017     INFLUENZA VACCINE RULE BASED (Season Ended) 08/01/2019     FALL RISK ASSESSMENT  05/21/2020     ADVANCE DIRECTIVES DISCUSSED WITH PATIENT  11/04/2020     TD 18+ HE  11/04/2025     PNEUMOCOCCAL POLYSACCHARIDE VACCINE AGE 65 AND OVER  Completed     PNEUMOCOCCAL CONJUGATE VACCINE FOR ADULTS (PCV13 OR PREVNAR)  Completed        Subjective:   Chief Complaint: Wilfrid Wyman is an 73 y.o. male here for an Annual Wellness visit.   HPI: This 73-year-old man comes in for annual wellness visit.  His medical history is reviewed, electronic medical records obtained reflectance no.  He has numerous concerns today.  First, he has ongoing chest tightness on the left side of his chest.  He has had this for over a year.  Seems to be worse in the morning.  Also can be related with food.  Does not seem to have any decrease in exercise tolerance, rode his bike 30 miles yesterday.  No associated nausea or diaphoresis.  He had a stress echocardiogram last year which generally look okay although he had some ST wave depression.  He also had an echocardiogram which looked okay.  He has noticed some poor balance.  This is a couple of issues, one he feels lightheaded sometimes when he goes from sitting to standing.  Secondly, he feels like he has poor balance and some weakness in his legs.   He also has numbness that goes up to his groin.  These have been long-standing.  He apparently has been seen at [facility] for this.  Apparently surgery have been recommended for some cervical spine issue.  He does not recall what the issue was.  Finally, he feels quite sleepy.  He uses CPAP nightly.  He sleeps 6 to 7 hours and after an hours of being awake he can take a nap.  He will often fall asleep reading the paper.    Review of Systems: please see above.  The rest of the review of systems are negative for all systems.    Patient Care Team:  Ish Gonzáles MD as PCP - General (Internal Medicine)     Patient Active Problem List   Diagnosis     Hyperlipemia     Coronary Artery Disease     Carotid artery disease     WESLEY on CPAP - 5cm H20     Past Medical History:   Diagnosis Date     Carotid artery occlusion      Coronary artery disease      ED (erectile dysfunction)      Family history of myocardial infarction     father 60     GERD (gastroesophageal reflux disease)      High cholesterol      Hyperlipidemia      WESLEY on CPAP       Past Surgical History:   Procedure Laterality Date     CARDIAC CATHETERIZATION  11/24/15    stent x 2     CORONARY STENT PLACEMENT       KNEE ARTHROSCOPY Bilateral       Family History   Problem Relation Age of Onset     Valvular heart disease Mother 70        bicuspid     Stroke Mother 75         age 82     Acute Myocardial Infarction Father 60     Other Brother          [place]     Other Sister         does not know history     Obesity Daughter      No Medical Problems Daughter      No Medical Problems Son       Social History     Socioeconomic History     Marital status:      Spouse name: Not on file     Number of children: Not on file     Years of education: Not on file     Highest education level: Not on file   Occupational History     Not on file   Social Needs     Financial resource strain: Not on file     Food insecurity:     Worry: Not on file      "Inability: Not on file     Transportation needs:     Medical: Not on file     Non-medical: Not on file   Tobacco Use     Smoking status: Former Smoker     Last attempt to quit: 10/22/2000     Years since quittin.5     Smokeless tobacco: Never Used   Substance and Sexual Activity     Alcohol use: Yes     Comment: 10 drinks/month     Drug use: No     Sexual activity: Not on file   Lifestyle     Physical activity:     Days per week: Not on file     Minutes per session: Not on file     Stress: Not on file   Relationships     Social connections:     Talks on phone: Not on file     Gets together: Not on file     Attends Quaker service: Not on file     Active member of club or organization: Not on file     Attends meetings of clubs or organizations: Not on file     Relationship status: Not on file     Intimate partner violence:     Fear of current or ex partner: Not on file     Emotionally abused: Not on file     Physically abused: Not on file     Forced sexual activity: Not on file   Other Topics Concern     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        Current Outpatient Medications   Medication Sig Dispense Refill     aspirin 81 MG EC tablet Take 81 mg by mouth daily.       atorvastatin (LIPITOR) 80 MG tablet TAKE 1 TABLET BY MOUTH EVERY NIGHT AT BEDTIME 90 tablet 1     omeprazole (PRILOSEC) 20 MG capsule TAKE 1 CAPSULE BY MOUTH DAILY 90 capsule 5     sildenafil (VIAGRA) 50 MG tablet Take 50 mg by mouth daily as needed for erectile dysfunction.       No current facility-administered medications for this visit.       Objective:   Vital Signs:   Visit Vitals  /80 (Patient Site: Right Arm, Patient Position: Sitting, Cuff Size: Adult Regular)   Pulse (!) 50   Ht 6' 1\" (1.854 m)   Wt (!) 228 lb (103.4 kg)   SpO2 95%   BMI 30.08 kg/m         VisionScreening:  No exam data present     PHYSICAL EXAM  EYES: Eyelids, conjunctiva, and sclera were normal. Pupils were normal. " Cornea, iris, and lens were normal bilaterally.  HEAD, EARS, NOSE, MOUTH, AND THROAT: Head and face were normal. Hearing was normal to voice and the ears were normal to external exam. Nose appearance was normal and there was no discharge. Oropharynx was normal.  NECK: Neck appearance was normal. There were no neck masses and the thyroid was not enlarged.  RESPIRATORY: Breathing pattern was normal and the chest moved symmetrically.  Percussion/auscultatory percussion was normal.  Lung sounds were normal and there were no abnormal sounds.  CARDIOVASCULAR: Heart rate and rhythm were normal.  S1 and S2 were normal and there was a 2 out of 6 right upper sternal border systolic murmur. Peripheral pulses in arms and legs were normal.  Jugular venous pressure was normal.  There was no peripheral edema.  GASTROINTESTINAL: The abdomen was normal in contour.  Bowel sounds were present.  Percussion detected no organ enlargement or tenderness.  Palpation detected no tenderness, mass, or enlarged organs.   MUSCULOSKELETAL: Skeletal configuration was normal and muscle mass was normal for age. Joint appearance was overall normal.  LYMPHATIC: There were no enlarged nodes.  SKIN/HAIR/NAILS: Skin color was normal.  There were no skin lesions.  Hair and nails were normal.  NEUROLOGIC: The patient was alert and oriented to person, place, time, and circumstance. Speech was normal. Cranial nerves were normal.  He has slight broad-based gait.  He is unable to do tandem walk.  Slight weakness with flexion at his knees.  Hyperreflexic at the left patellar and left bicep.  Initially a couple of beats of clonus in the left lower extremity but I could not repeat this.  PSYCHIATRIC:  Mood and affect were normal and the patient had normal recent and remote memory. The patient's judgment and insight were normal.    Assessment Results 5/21/2019   Activities of Daily Living No help needed   Instrumental Activities of Daily Living No help needed    Get Up and Go Score Less than 12 seconds   Mini Cog Total Score 5   Some recent data might be hidden     A Mini-Cog score of 0-2 suggests the possibility of dementia, score of 3-5 suggests no dementia    Identified Health Risks:     The patient's PHQ-9 score is consistent with mild depression. He was provided with information regarding depression and was advised to schedule a follow up appointment in 4 weeks to further address this issue.  Patient's advanced directive was discussed and I am comfortable with the patient's wishes.

## 2021-05-30 VITALS — BODY MASS INDEX: 29.69 KG/M2 | WEIGHT: 224 LBS | HEIGHT: 73 IN

## 2021-05-30 VITALS — HEIGHT: 73 IN | WEIGHT: 226 LBS | BODY MASS INDEX: 29.95 KG/M2

## 2021-05-30 VITALS — WEIGHT: 226 LBS | BODY MASS INDEX: 29.95 KG/M2 | HEIGHT: 73 IN

## 2021-06-01 ENCOUNTER — RECORDS - HEALTHEAST (OUTPATIENT)
Dept: ADMINISTRATIVE | Facility: CLINIC | Age: 76
End: 2021-06-01

## 2021-06-01 VITALS — HEIGHT: 73 IN | WEIGHT: 222 LBS | BODY MASS INDEX: 29.42 KG/M2

## 2021-06-01 NOTE — TELEPHONE ENCOUNTER
----- Message from Lissette Mathew sent at 10/2/2019  3:21 PM CDT -----  Regarding: [facility]  Caller: Wilfrid    Primary cardiologist: Dr. Valdes     Detailed reason for call: Wilfrid is upset about his recent CT test results, and he is unable to get appt w/[facility] until end of November at any location.  What does Dr. Valdes recommend? Please call back.     Best phone number: [phone]    Best time to contact: Today    Ok to leave a detailed message? Yes    Device? No

## 2021-06-02 VITALS — HEIGHT: 73 IN | WEIGHT: 228 LBS | BODY MASS INDEX: 30.22 KG/M2

## 2021-06-02 NOTE — TELEPHONE ENCOUNTER
----- Message from Ish Gonzáles MD sent at 10/3/2019  8:52 AM CDT -----  Regarding: FW: concern about neuropathy  Can you call Wilfrid Wyman and see if he ever saw neurology, Dr. Suh?  Thanks.  ----- Message -----  From: Ayala Sanabria MD  Sent: 10/3/2019   8:43 AM CDT  To: Ish Gonzáles MD  Subject: concern about neuropathy                         Dr. Gonzáles,    I had the pleasure of seeing your patient in clinic to discuss his lung nodules. Patient brought up his ascending peripheral neuropathy. He is concerned about possibility of falling due to associated balance issues (that have been worsening). I wonder if a lumbar MRI might be helpful for evaluation of some sort of cord/nerve impingement. I promised patient that I will alert you to his concerns.     Sincerely,  Jesenia

## 2021-06-02 NOTE — PATIENT INSTRUCTIONS - HE
Thank you for coming in for your appointment to discuss your cold & CT scan of the chest. Your imaging showed small (sub-6 mm) nodules.     Plan:     CT scan of the chest (dedicated) soon    I will let you know the CT scan results & will arrange for future chest imaging    Your cold does not seem to be a pneumonia (based on your CT scan) -- sometimes post-infectious cough can last for 2-3 months    Try an over the counter allergy medication such as Zyrtec or a nasal spray such as Flonase to help with your nasal congestion.    Please, remember that appropriate use of inhalers is essential to their efficacy. If you cannot remember the instructions on how to properly use the prescribed inhalers, you can visit your pharmacist to request a demonstration, or you can review a video online. There are many helpful videos on YouTube that you can access to review instructions and demonstrations on the use of different inhalers. You should exercise common sense when looking for videos -- best and most informative inhaler videos come from health care organizations.    You should follow up in 6 months.     If you have any questions or concerns, please, call our clinic at [phone]. [phone]

## 2021-06-02 NOTE — PROGRESS NOTES
Pulmonary Clinic Outpatient Consultation  10/3/2019     Assessment and Plan:   74 year old former smoker with the most pertinent medical history of CAD, presenting for evaluation of lung nodules.    #. Sub-6 mm lung nodules on a limited CTA chest.  #. URI w/ post-infectious cough.  #. BLE neuropathy, ascending.      Will get a dedicated non-contrast CT chest to fully assess the nodules    Follow up imaging based on CT scan results (above)    Recommended OTC antihistamine or nasal steroid spray for upper airway congestion symptoms    Educated on post-infectious cough    Influenza vaccination today    Patient should return to clinic in 6 months for a follow up.    I appreciate the opportunity to participate in the care of Wilfrid Wyman.  Please, feel free to contact me at any time.    Ayala Sanabria MD  Pulmonary and Critical Care   ______________________________________________________________________________    CC: lung nodules    HPI:   Wilfrid Wyman is a 74 y.o. former smoker with history of CAD & obesity, presenting today for evaluation of pulmonary nodules noted on a CT chest over-read of imaging performed as part of his cardiac work-up.     Patient reports that he is not significantly concerned about the lung nodules and is comfortable with whatever follow-up we advised.  States that he was able to review imaging report on My Chart.  He is more concerned about 6 weeks of cough he has been having.  Reports a significant URI about 3 to 4 weeks ago.  Now he is dealing with very slowly improving cough.  He is coughing up creamy thick tan phlegm.  Complains of sore throat and upper airway congestion that is worse in the morning.  Denies any fevers or night sweats.  Reports that he gets cold 2 or 3 times a year, but the usually last for 10 days.  He is normally physically active, but has been not as active for the last 6 weeks because he usually works out in the morning and his symptoms are at worst in the  morning.  Smoker with a 34-pack-year history who quit smoking in .    Patient's unrelated complaint today is that of progressively worsening balance and BLE neuropathy that has been slowly ascending towards his waist over the last couple of decades.  States that he has seen neurology at [facility] in the past.  Is very concerned about potential of falls in the future.    ROS:  Review of Systems - 10 point ROS reviewed and noted to be negative w/ exceptions as detailed in the HPI.    PMH:  Patient Active Problem List    Diagnosis Date Noted     Carotid artery disease 2015     WESLEY on CPAP - 5cm H20 2015     Hyperlipemia      Coronary Artery Disease      PSH:  Past Surgical History:   Procedure Laterality Date     CARDIAC CATHETERIZATION  11/24/15    stent x 2     CORONARY STENT PLACEMENT       KNEE ARTHROSCOPY Bilateral      Allergies:  Allergies   Allergen Reactions     Penicillins Rash     Family HX:  Family History   Problem Relation Age of Onset     Valvular heart disease Mother 70        bicuspid     Stroke Mother 75         age 82     Acute Myocardial Infarction Father 60     Other Brother          [place]     Other Sister         does not know history     Obesity Daughter      No Medical Problems Daughter      No Medical Problems Son        Social Hx:  Social History     Socioeconomic History     Marital status:      Spouse name: Not on file     Number of children: Not on file     Years of education: Not on file     Highest education level: Not on file   Occupational History     Not on file   Social Needs     Financial resource strain: Not on file     Food insecurity:     Worry: Not on file     Inability: Not on file     Transportation needs:     Medical: Not on file     Non-medical: Not on file   Tobacco Use     Smoking status: Former Smoker     Last attempt to quit: 10/22/2000     Years since quittin.9     Smokeless tobacco: Never Used   Substance and Sexual  "Activity     Alcohol use: Yes     Comment: 10 drinks/month     Drug use: No     Sexual activity: Not on file   Lifestyle     Physical activity:     Days per week: Not on file     Minutes per session: Not on file     Stress: Not on file   Relationships     Social connections:     Talks on phone: Not on file     Gets together: Not on file     Attends Mandaen service: Not on file     Active member of club or organization: Not on file     Attends meetings of clubs or organizations: Not on file     Relationship status: Not on file     Intimate partner violence:     Fear of current or ex partner: Not on file     Emotionally abused: Not on file     Physically abused: Not on file     Forced sexual activity: Not on file   Other Topics Concern     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       Current Meds:  Current Outpatient Medications   Medication Sig Dispense Refill     aspirin 81 MG EC tablet Take 81 mg by mouth daily.       atorvastatin (LIPITOR) 80 MG tablet TAKE 1 TABLET BY MOUTH EVERY NIGHT AT BEDTIME 90 tablet 1     omeprazole (PRILOSEC) 20 MG capsule TAKE 1 CAPSULE BY MOUTH DAILY 90 capsule 5     sildenafil (VIAGRA) 50 MG tablet Take 50 mg by mouth daily as needed for erectile dysfunction.       No current facility-administered medications for this visit.      Physical Exam:  /70   Pulse (!) 46   Resp 12   Ht 6' 1\" (1.854 m)   Wt (!) 225 lb (102.1 kg)   SpO2 95%   BMI 29.69 kg/m    Gen: tan elderly  man, alert, oriented, no distress  HEENT: nasal turbinates are erythematous, no oropharyngeal lesions, no cervical or supraclavicular lymphadenopathy; no stridor  CV: RRR, no M/G/R  Resp: equal bilateral air entry, breath sounds clear throughout, no focal crackles or wheezes; able to converse in full sentences w/ no respiratory distress  Abd: soft, nontender, no palpable organomegaly  Skin: no apparent rashes  Ext: no cyanosis, clubbing or edema  Neuro: alert, " nonfocal    Labs: personally reviewed in the EMR.    Imaging studies: personally reviewed. Below are the Radiology interpretations.  CTA coronary from 9/19/19 over-read, limited chest view:  LIMITED CHEST: New 5 mm indeterminate nodule medial right lung base on image 34. Stable 3 mm lateral subpleural nodule on image 36. There is a questionable additional small nodule posteriorly on image 44 which is new.  LIMITED MEDIASTINUM: Negative.  LIMITED UPPER ABDOMEN: Fatty infiltration of liver.

## 2021-06-02 NOTE — TELEPHONE ENCOUNTER
Spoke to patient, pt was in a car,  LM on  with [facility] [phone] to call and schedule appointment

## 2021-06-02 NOTE — TELEPHONE ENCOUNTER
Called patient to update him on CT chest results. Results are reassuring -- majority of the nodules seen on prior CT chest are stable, one is smaller in size, and there are no additional nodules. In deference to patient's smoking history, will repeat a CT chest in 1 year for a follow up. Patient is comfortable w/ this plan.     Ayala Sanabria

## 2021-06-03 VITALS
SYSTOLIC BLOOD PRESSURE: 122 MMHG | WEIGHT: 225 LBS | OXYGEN SATURATION: 95 % | BODY MASS INDEX: 29.82 KG/M2 | HEIGHT: 73 IN | HEART RATE: 46 BPM | RESPIRATION RATE: 12 BRPM | DIASTOLIC BLOOD PRESSURE: 70 MMHG

## 2021-06-03 VITALS — WEIGHT: 228 LBS | HEIGHT: 73 IN | BODY MASS INDEX: 30.22 KG/M2

## 2021-06-04 VITALS — WEIGHT: 230 LBS | BODY MASS INDEX: 30.34 KG/M2

## 2021-06-04 VITALS — HEIGHT: 73 IN | BODY MASS INDEX: 30.48 KG/M2 | WEIGHT: 230 LBS

## 2021-06-07 NOTE — PROGRESS NOTES
Review of Systems - History obtained from the patient  General ROS: positive for  - weight gain  Psychological ROS: negative  Ophthalmic ROS: negative  ENT ROS: positive for - sneezing, cough  Allergy and Immunology ROS: negative  Hematological and Lymphatic ROS: negative  Endocrine ROS: negative  Respiratory ROS: negative  Cardiovascular ROS: positive for - chest pain, fatigue   Gastrointestinal ROS: positive for - heartburn  Genito-Urinary ROS: negative  Musculoskeletal ROS: positive for - muscle aches, ? Statins   Neurological ROS: positive for - balance problems  Dermatological ROS: negative

## 2021-06-07 NOTE — TELEPHONE ENCOUNTER
Refuse refill for atorvastatin. Pt has not seen cardiologist in over 1 year. Contact PCP.    
DTN - Ok for refill    Siobhan, would it be possible to just forward me any prescriptions that need refilling rather than refusing them?  It is a lot less steps for me if you forward it.  We often do this within the clinic.  Just a thought to reduce the amount of clicks I have to do.  I also think it would be less clicks for you too!  You would just right click it and hit forward and then type my name.  Thanks!  
81yo lady presented for R mandibular pain and swelling , secondary to sialoadenitis , she was evaluated by ENT and ID , started on IV Unasyn , along with Salivary gland replacement and one dose of IV Decadron , with significant clinical improvement , she will be discharged on PO Augmentin for 10 more days , to follow up with ENT in one week

## 2021-06-07 NOTE — PROGRESS NOTES
"Wilfrid Wyman is a 74 y.o. male who is being evaluated via a telephone visit.      The patient has been notified of following:     \"This telephone visit will be conducted via a call between you and your physician/provider. We have found that certain health care needs can be provided without the need for a physical exam.  This service lets us provide the care you need with a short phone conversation.  If a prescription is necessary we can send it directly to your pharmacy.  If lab work is needed we can place an order for that and you can then stop by our lab to have the test done at a later time.    Telephone visits are billed at different rates depending on your insurance coverage. During this emergency period, for some insurers they may be billed the same as an in-person visit.  Please reach out to your insurance provider with any questions.    If during the course of the call the physician/provider feels a telephone visit is not appropriate, you will not be charged for this service.\"    Patient has given verbal consent to a Telephone visit? Yes    Patient would like to receive their AVS by AVS Preference: Shannon.    Additional provider notes:   Mr. Wyman is doing well. He still has a moderate dry cough that has been present for the last 6+ months. It is not bothering him. He has no questions for me today other than my opinions on when the economy should be reopened in the context of the pandemic. His main problem right now is dealing w/ his broken CPAP machine that he received from [facility] about 7 years ago. Machine broke a month ago & he is trying to replace it. He is going to call [employer] and send them some pictures of the broken power cord, but may call us back to see if we know of any way for him to get his machine replaced. I explained that I do not know what specifically he would have to do in this case, but that I can ask around if [facility] cannot help him.     He has a CT scan ordered for 10/2020 for " a nodule follow-up. He should get that imaging done & I will discuss the results w/ him at that time.    This is a non-billable visit.    Ayala Sanabria MD  Pulmonary and Critical Care

## 2021-06-08 NOTE — PROGRESS NOTES
Medicare Annual Preventive Physical Exam (Wellness Assessment)   Wilfrid Wyman   71 y.o.  male    Date of visit: 2/8/2017  Physician: Ish Gonzáles MD     Assessment and Plan   1. Screening for colon cancer  - Ambulatory referral for Colonoscopy    2. Immunization due  - Pneumococcal polysaccharide vaccine 23-valent 3 yo or older, subq/IM  - Varicella-zoster vaccine subq    3. Need for prophylactic vaccination and inoculation against influenza  - Influenza High Dose, Seasonal 65+ yrs    4. Encounter for screening for lung cancer  Patient is just outside of screening guidelines, quit smoking 17 years ago interested in lung cancer screening and I will have him meet with our pulmonary experts  - CT Low Dose Lung Screening Chest; Future    5. Screening for prostate cancer  - PSA (Prostatic-Specific Antigen), Annual Screen    6. Screening for abdominal aortic aneurysm  - US Abdominal Aorta; Future    7. Carotid artery disease  - US Carotid Bilateral; Future    8. Routine general medical examination at a health care facility  This is a generally healthy 71-year-old man with vascular disease.  Quite physically active.    9. Chest pain  Atypical chest pain, nonexertional, no associated symptoms, I encouraged him to restart his antacid and we will arrange for an echo stress test  - Echo Stress Exercise; Future    10. Coronary artery disease  Continue secondary prevention, stress test as above  - HM2(CBC w/o Differential)  - Comprehensive Metabolic Panel  - Lipid Cascade  - Glycosylated Hemoglobin A1c    11. Systolic murmur / Family history of bicuspid aortic valve  Given his mother's history of bicuspid aortic valve, we will perform a formal echocardiogram and I think we can get this done as same time as his stress test  - Echo Complete; Future    16. Advanced care planning/counseling discussion  Advanced Care Planning discussed today: yes, edward Singh would be medical decision maker, full code, does not want  heroic life-sustaining measures if no chance of recovery, 16 minutes spent discussing today  Healthcare directive scanned into EMR: No, he will locate and bring to clinic    Return in about 1 year (around 2/8/2018) for annual physical.     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          Past Medical History   Patient Active Problem List   Diagnosis     Hyperlipemia     Coronary Artery Disease     Carotid artery disease     WESLEY on CPAP - 5cm H20       Past Surgical History  He has a past surgical history that includes Knee arthroscopy (Bilateral) and Cardiac catheterization (11/24/15).     History of Present Illness   This 71 y.o. old man comes in for initial annual wellness visit.  He is generally feeling well.  He has frequent episodes of brief chest discomfort lasting less than 1 minute.  These can come on randomly.  They're nonexertional.  They feel a bit like a catch in his chest.  No associated nausea, no diaphoresis and no shortness of breath.  He feels fine when he exercises and he does quite a bit of bike riding.  These came on shortly after his cardiac stent placement.  Additionally, he stopped taking omeprazole.     Review of Systems: A comprehensive review of systems was negative except as noted.     Risk Assessment   Depression: PHQ-9 reviewed; score is 0    Cognitive Impairment:  Mini-Cog reviewed, number of words correctly recalled + clock score: 3 work recall suggesting no dementia    Falls Risk Assessment:  Please see the nursing progress note for questionnaire response  Hearing Assessment:   Please see the nursing progress note for questionnaire response  Home Safety Review:  Please see the nursing progress note for questionnaire response  Diet and exercise reviewed: yes     Medications, Allergies and Immunizations    Current Outpatient  "Prescriptions   Medication Sig Dispense Refill     aspirin 81 MG EC tablet Take 81 mg by mouth daily.       atorvastatin (LIPITOR) 40 MG tablet Take 1 tablet (40 mg total) by mouth daily. 90 tablet 3     clopidogrel (PLAVIX) 75 mg tablet TAKE 1 TABLET BY MOUTH DAILY 90 tablet 0     sildenafil (VIAGRA) 50 MG tablet Take 50 mg by mouth daily as needed for erectile dysfunction.       No current facility-administered medications for this visit.      Allergies   Allergen Reactions     Penicillins Rash     Immunization History   Administered Date(s) Administered     Influenza high dose, seasonal 2015, 2017     Pneumo Conj 13-V (2010&after) 2015     Pneumo Polysac 23-V 2017     Tdap 2015     ZOSTER 2017        Family and Social History   Family History   Problem Relation Age of Onset     Valvular heart disease Mother 70     bicuspid     Stroke Mother 75      age 82     Acute Myocardial Infarction Father 60     Other Brother       [place]     Other Sister      does not know history     Obesity Daughter      No Medical Problems Daughter      No Medical Problems Son         Social History   Substance Use Topics     Smoking status: Former Smoker     Quit date: 10/22/2000     Smokeless tobacco: Never Used     Alcohol use Yes      Comment: 10 drinks/month        Physical Exam   General Appearance:   Exceedingly pleasant, no acute distress    Visit Vitals     /72 (Patient Site: Left Arm, Patient Position: Sitting, Cuff Size: Adult Regular)     Pulse (!) 47     Ht 6' 1\" (1.854 m)     Wt (!) 226 lb (102.5 kg)     SpO2 93%     BMI 29.82 kg/m2    Body mass index is 29.82 kg/(m^2).    EYES: Eyelids, conjunctiva, and sclera were normal. Pupils were normal. Cornea, iris, and lens were normal bilaterally.  HEAD, EARS, NOSE, MOUTH, AND THROAT: Head and face were normal. Hearing was normal to voice and the ears were normal to external exam. Nose appearance was normal and there was " no discharge. Oropharynx was normal.  NECK: Neck appearance was normal. There were no neck masses and the thyroid was not enlarged.  RESPIRATORY: Breathing pattern was normal and the chest moved symmetrically.  Percussion/auscultatory percussion was normal.  Lung sounds were normal and there were no abnormal sounds.  CARDIOVASCULAR: Heart rate and rhythm were normal.  S1 and S2 were normal and there was a 2 out of 6 right upper sternal border systolic murmur. Peripheral pulses in arms and legs were normal.  Jugular venous pressure was normal.  There was no peripheral edema.  GASTROINTESTINAL: The abdomen was normal in contour.  Bowel sounds were present.  Percussion detected no organ enlargement or tenderness.  Palpation detected no tenderness, mass, or enlarged organs.   MUSCULOSKELETAL: Skeletal configuration was normal and muscle mass was normal for age. Joint appearance was overall normal.  LYMPHATIC: There were no enlarged nodes.  SKIN/HAIR/NAILS: Skin color was normal.  There were no skin lesions.  Hair and nails were normal.  NEUROLOGIC: The patient was alert and oriented to person, place, time, and circumstance. Speech was normal. Cranial nerves were normal. Motor strength was normal for age. The patient was normally coordinated.  PSYCHIATRIC:  Mood and affect were normal and the patient had normal recent and remote memory. The patient's judgment and insight were normal.  unctional status: direct observation reveals these concerns: none  Safety status: direct observation reveals these concerns: none     Additional Information   Counseling and education provided today includes proper nutrition and body habitus, fall prevention, and for those items ordered above. Plan for future preventive services in Patient Instructions, printed on After Visit Summary and given to patient. Patient questions answered.     Ish Gonzáles MD  Internal Medicine  Contact me at [phone]

## 2021-06-08 NOTE — PROGRESS NOTES
CSS Portion of Medicare Wellness Assessment       VISION AND HEARING SCREENING:  Visual Acuity Screening - Snellen Chart   Visual acuity OD (right eye): 20/ 20   Visual acuity OS (left eye): 20/ 25   Visual acuity OU (both eyes): 20/ 20     Patient wears corrective lenses: Yes  Patient has problems with their hearing: No  Patient wears hearing aids: No    ACTIVITIES OF DAILY LIVING SCREEN:  Does patient drive a car? Yes  Has patient had any accidents in the last 12 months?  No    The pt states they can get to places outside of walking distance without help  The pt states they can go shopping for groceries or clothes without help  The pt states they can prepare their own meals and do housework without help  The pt states they can handle their own money without help    FALLS RISK ASSESSMENT:  Has the patient fallen in the past 12 months?   No  Get Up and Go Test: less than 10 seconds    HOME SAFETY SCREEN:  The patient has a working smoke detector in their home or apartment: Yes  The patient has space heater(s) at home: No  The patient has a phone in their bedroom at home: Yes    Mini-Cog Test for Cognitive Impairment    Number of words correctly recalled: 3  Clock Face Task: normal    Health Maintenance   Topic Date Due     ZOSTER VACCINE  08/30/2006     COLONOSCOPY  10/01/2014     INFLUENZA VACCINE RULE BASED (1) 08/01/2016     FALL RISK ASSESSMENT  02/08/2018     ADVANCE DIRECTIVES DISCUSSED WITH PATIENT  11/04/2020     TD 18+ HE  11/04/2025     PNEUMOCOCCAL POLYSACCHARIDE VACCINE AGE 65 AND OVER  Completed     PNEUMOCOCCAL CONJUGATE VACCINE FOR ADULTS (PCV13 OR PREVNAR)  Completed     AAA Screen: indicated  Lung Cancer Screen: dose not meet criteria    Above information collected by: Joy C Steinert

## 2021-06-13 NOTE — TELEPHONE ENCOUNTER
Coronavirus (COVID-19) Notification    Reason for call  Notify of POSITIVE  COVID-19 lab result, assess symptoms,  review [facility] recommendations    Lab Result   Lab test for 2019-nCoV rRt-PCR or SARS-COV-2 PCR  Oropharyngeal AND/OR nasopharyngeal swabs were POSITIVE for 2019-nCoV RNA [OR] SARS-COV-2 RNA (COVID-19) RNA     We have been unable to reach Patient by phone at this time to notify of their Positive COVID-19 result.  Left voicemail message requesting a call back to [phone] [facility] for results.        POSITIVE COVID-19 Letter sent.    Sienna Cooper RN

## 2021-06-13 NOTE — PROGRESS NOTES
Patient would like the video invitation sent by: [employer]       ASSESSMENT:  1. Clinical diagnosis of COVID-19  Symptomatic December 10.  Positive test December 12.  Quarantine December 10 through December 20 discussed.  Consider enrollment into Covid trials however probably too late now day 7.    2. Atherosclerosis of native coronary artery of native heart without angina pectoris  Reviewed stent and current cholesterol guidelines    3. Pure hypercholesterolemia  Could try a slightly decreased dose of atorvastatin due to back pain    4. Primary insomnia  New.  Provide short-term alprazolam as requested.  No other symptomatic treatment needed  - ALPRAZolam (XANAX) 0.5 MG tablet; Take 1 tablet (0.5 mg total) by mouth at bedtime as needed for sleep or anxiety.  Dispense: 20 tablet; Refill: 0    Discussed vaccination issues and current recommendation to vaccinate those who have had the disease    Preventive Health Care:      PLAN:  Patient Instructions   Xanax as needed for sleep    Consider decrease in atorvastatin to 20 or 40 mg daily    Continue baby aspirin    Consider enrollment in Covid drug trials    Covid quarantine from December 10 through December 20    Call back if symptoms worsen    No orders of the defined types were placed in this encounter.    Return if symptoms worsen or fail to improve, for a phone/video follow up visit.      CHIEF COMPLAINT:  Chief Complaint   Patient presents with     Covid Concern     Positive        HISTORY OF PRESENT ILLNESS:  Wilfrid is a 75 y.o. male contacting the clinic today via video for questions regarding Covid.  He became symptomatic on December 10 with fever, chills, and aches.  He does not know how he got it.  He was tested on December 12 and was positive.  He felt poorly for several days but began to feel better about 2 days ago.  He stopped his statin and back pain has improved.  He no longer has fever.  Aches are markedly improved.  He is having much more trouble  sleeping    REVIEW OF SYSTEMS:   No chest pain.  No cough.  No trouble breathing.  No wheezing.  All other systems are negative.    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  Wt Readings from Last 3 Encounters:   20 (!) 230 lb (104.3 kg)   20 (!) 230 lb (104.3 kg)   10/03/19 (!) 225 lb (102.1 kg)       PHYSICAL EXAM:  (observations via Video)  Unshaven.  No coughing or shortness of breath.  Alert and oriented.  No cyanosis      ADDITIONAL HISTORY SUMMARIZED (2): Reviewed email messages  CARE EVERYWHERE/ EXTRA INFORMATION (1):   RADIOLOGY TESTS (1):   LABS (1): Covid +   CARDIOLOGY/MEDICINE TESTS (1):   INDEPENDENT REVIEW (2 each):     Total data points: 3    Video Start time: 12:23 PM  Video End time: 12:51 PM    The visit lasted a total of 28 minutes     CA Intake Time: 5    I have reviewed and updated the patient's Past Medical History, Social History, Family History as appropriate.    MEDICATIONS: Reviewed and updated per CA and MD  Current Outpatient Medications   Medication Sig Dispense Refill     aspirin 81 MG EC tablet Take 81 mg by mouth daily.       atorvastatin (LIPITOR) 80 MG tablet Take 1 tablet (80 mg total) by mouth at bedtime. 90 tablet 3     miscellaneous medical supply [facility] [place] [place] CPAP machine for home use at pressure: 7cmw,  nasal mask with cushion x 1,       sildenafiL (VIAGRA) 50 MG tablet Take 1 tablet (50 mg total) by mouth daily as needed for erectile dysfunction. 10 tablet 5     ALPRAZolam (XANAX) 0.5 MG tablet Take 1 tablet (0.5 mg total) by mouth at bedtime as needed for sleep or anxiety. 20 tablet 0     No current facility-administered  "medications for this visit.          The patient has been notified of following:     \"This video visit will be conducted via a call between you and your physician/provider. We have found that certain health care needs can be provided without the need for an in-person physical exam.  This service lets us provide the care you need with a video conversation.  If a prescription is necessary we can send it directly to your pharmacy.  If lab work is needed we can place an order for that and you can then stop by our lab to have the test done at a later time.    Video visits are billed at different rates depending on your insurance coverage. Please reach out to your insurance provider with any questions.    If during the course of the call the physician/provider feels a video visit is not appropriate, you will not be charged for this service.\"    Patient has given verbal consent to a Video visit? Yes  How would you like to obtain your AVS? AVS Preference: [employer].  If dropped by the video visit, the video invitation should be sent to: Text to cell phone: [phone]  Will anyone else be joining your video visit? No     Video-Visit Details    Type of service:  Video Visit    Originating Location (pt. Location): Home    Distant Location (provider location):  [facility]     Platform used for Video Visit: Daryl Avina MD     "

## 2021-06-13 NOTE — PATIENT INSTRUCTIONS - HE
Xanax as needed for sleep    Consider decrease in atorvastatin to 20 or 40 mg daily    Continue baby aspirin    Consider enrollment in Covid drug trials    Covid quarantine from December 10 through December 20    Call back if symptoms worsen

## 2021-06-15 NOTE — TELEPHONE ENCOUNTER
Called and spoke with Don.    Per Dr. Sanabria: Could you, please, notify Mr. Wyman that I reviewed the results of his CT scan.   Small nodules seen in the fall have resolved, and a small nodule in his left lower lung is stable & is most likely a lymph node (I.e. will not change & does not need to be followed).   Next step(s): no further imaging or follow-up required.       Patient had no further questions.

## 2021-06-16 NOTE — TELEPHONE ENCOUNTER
Telephone Encounter by Mary Hernandez RN at 10/2/2019  3:30 PM     Author: Mary Hernandez RN Service: -- Author Type: Registered Nurse    Filed: 10/2/2019  3:35 PM Encounter Date: 10/2/2019 Status: Signed    : Mary Hernandez RN (Registered Nurse)       Return call to patient. Reassurance given. Reviewed Dr. Valdes's MyChart response to patient (See Below). Patient will call to schedule Annual Follow-up with Dr. Valdes. Patient given direct contact information for further questions or concerns. -ejb

## 2021-06-17 NOTE — PATIENT INSTRUCTIONS - HE
Patient Instructions by Ish Gonzáles MD at 5/21/2019 11:00 AM     Author: Ish Gonzáles MD Service: -- Author Type: Physician    Filed: 5/21/2019  1:01 PM Encounter Date: 5/21/2019 Status: Signed    : Ish Gonzáles MD (Physician)         Patient Education   Depression and Suicide in Older Adults  Nearly 2 million older Americans have some type of depression. Sadly, some of them even take their own lives. Yet depression among older adults is often ignored. Learn the warning signs. You may help spare a loved one needless pain. You may also save a life.       What Is Depression?  Depression is a mood disorder that affects the way you think and feel. The most common symptom is a feeling of deep sadness. People who are depressed also may seem tired and listless. And nothing seems to give them pleasure. Its normal to grieve or be sad sometimes. But sadness lessens or passes with time. Depression rarely goes away or improves on its own. Other symptoms of depression are:    Sleeping more or less than normal    Eating more or less than normal    Having headaches, stomachaches, or other pains that dont go away    Feeling nervous, empty, or worthless    Crying a great deal    Thinking or talking about suicide or death    Feeling confused or forgetful  What Causes It?  The causes of depression arent fully known. Certain chemicals in the brain play a role. Depression does run in families. And life stresses can also trigger depression in some people. That may be the case with older adults. They often face great burdens, such as the death of friends or a spouse. They may have failing health. And they are more likely to be alone, lonely, or poor.  How You Can Help  Often, depressed people may not want to ask for help. When they do, they may be ignored. Or, they may receive the wrong treatment. You can help by showing parents and older friends love and support. If they seem depressed, help them find  the right treatment. Talk to your doctor. Or contact a local mental health center, social service agency, or hospital. With modern treatment, no one has to suffer from depression.  Resources:    National [place] of Mental Health  647.920.6042  www.nimh.nih.gov    National [place] on Mental Illness  491.913.1058  www.pete.org    [facility] [place]  458.670.2716  www.[facility].org    National Suicide Hotline  820.975.1671 (800-SUICIDE)      5807-2231 [employer]. [address] [place] [address]. All rights reserved. This information is not intended as a substitute for professional medical care. Always follow your healthcare professional's instructions.           Advance Directive  Patients advance directive was discussed and I am comfortable with the patients wishes.  Patient Education   Personalized Prevention Plan  You are due for the preventive services outlined below.  Your care team is available to assist you in scheduling these services.  If you have already completed any of these items, please share that information with your care team to update in your medical record.  Health Maintenance   Topic Date Due   ? COLONOSCOPY  10/01/2014   ? ZOSTER VACCINES (2 of 3) 04/05/2017   ? INFLUENZA VACCINE RULE BASED (Season Ended) 08/01/2019   ? FALL RISK ASSESSMENT  05/21/2020   ? ADVANCE DIRECTIVES DISCUSSED WITH PATIENT  11/04/2020   ? TD 18+ HE  11/04/2025   ? PNEUMOCOCCAL POLYSACCHARIDE VACCINE AGE 65 AND OVER  Completed   ? PNEUMOCOCCAL CONJUGATE VACCINE FOR ADULTS (PCV13 OR PREVNAR)  Completed

## 2021-06-19 NOTE — LETTER
Letter by Trent Mcmahan MD at      Author: Trent Mcmahan MD Service: -- Author Type: --    Filed:  Encounter Date: 9/20/2019 Status: (Other)         Wilfrid Wyman  [address] [place] [address]    September 20, 2019    Dear  Supaphil,    Welcome to [facility]! Your appointment information is below.   Please bring the following to your appointment:    Insurance Card, so we may scan it for our records    Drivers license or valid ID, so we may scan it for our records    Co-pay (as applicable per your insurance plan)    A current list of your medications including over the counter products such as vitamins and supplements    Your medical records including copies of X-Ray films if you are transferring your care from another clinic.  If you do not have your records, please fill out the release of information form and we will request those records.     Provider: Trent Mcmahan MD  Appointment Date: November 6, 2019  Arrival Time: 9:30 am for Dr. Mcmahan    Location: [facility]         [address]        [place], [address]    **Please allow adequate time for your commute and parking. If you are more than 10 minutes late, you may be asked to reschedule.     If you need to cancel or reschedule your appointment, please notify us at least 24 hours prior to your appointment time so we are able to make this time available for another patient.    Thank you for choosing the [facility] for your health care needs. If you have any questions, please do not hesitate to contact us at any time at   [phone]. We look forward to caring for you.     Sincerely,     [facility] staff

## 2021-06-19 NOTE — LETTER
Letter by Ish Gonzáles MD at      Author: Ish Gonzáles MD Service: -- Author Type: --    Filed:  Encounter Date: 6/13/2019 Status: (Other)        [facility] INTER[facility]  [address]  [place] [facility] [address]  [phone]         Wilfrid BRENT Cowartphil  [address] [place] [address]        06/13/19    Dear Wilfrid Wyman,     At [facility] we care about your health and well-being. Your primary care provider is committed to ensuring you receive high quality care and has chosen a network of specialists to assist in providing that care. Recently Dr. Gonzáles referred you to EMG testing for specialty care. We have made several attempts to connect with you to assist with scheduling, however we have been unable to reach you by phone.       It is important to your overall health to follow through with the recommendation from your provider. Please call [facility] at [phone] at your earliest convenience for assistance in scheduling an appointment. Thank you for choosing [facility] for your healthcare needs.       Sincerely,       [facility] Specialty Scheduling

## 2021-06-19 NOTE — LETTER
Letter by Ayala Sanabria MD at      Author: Ayala Sanabria MD Service: -- Author Type: --    Filed:  Encounter Date: 9/20/2019 Status: (Other)         Wilfrid Wyman  [address] [place] [address]    September 20, 2019    Dear Mr. Wyman,    Welcome to [facility]! Your appointment information is below.   Please bring the following to your appointment:    Insurance Card, so we may scan it for our records    Drivers license or valid ID, so we may scan it for our records    Co-pay (as applicable per your insurance plan)    A current list of your medications including over the counter products such as vitamins and supplements    Your medical records including copies of X-Ray films if you are transferring your care from another clinic.  If you do not have your records, please fill out the release of information form and we will request those records.     Provider: Ayala Sanabria MD  Appointment Date: October 3, 2019  Arrival Time: 8:00am for Dr. Sanabria    Location: [facility]         [address]        [place], [address]    **Please allow adequate time for your commute and parking. If you are more than 10 minutes late, you may be asked to reschedule.     If you need to cancel or reschedule your appointment, please notify us at least 24 hours prior to your appointment time so we are able to make this time available for another patient.    Thank you for choosing the [facility] for your health care needs. If you have any questions, please do not hesitate to contact us at any time at   [phone]. We look forward to caring for you.     Sincerely,     [facility] staff

## 2021-06-19 NOTE — LETTER
Letter by Ayala Sanabria MD at      Author: Ayala Sanabria MD Service: -- Author Type: --    Filed:  Encounter Date: 10/3/2019 Status: Signed         Ish Gonzáles MD  [address] [place] [address]                                  October 3, 2019    Patient: Wilfrid Wyman   MR Number: [id number]   YOB: 1945   Date of Visit: 10/3/2019     Dear Dr. Nivia MD:    Thank you for referring Wilfrid Wyman to me for evaluation. Below are the relevant portions of my assessment and plan of care.    If you have questions, please do not hesitate to call me. I look forward to following Wilfrid along with you.    Sincerely,        Ayala Sanabria MD          CC  No Recipients  Ayala Sanabria MD  10/3/2019  8:42 AM  Sign when Signing Visit  Pulmonary Clinic Outpatient Consultation  10/3/2019     Assessment and Plan:   74 year old former smoker with the most pertinent medical history of CAD, presenting for evaluation of lung nodules.    #. Sub-6 mm lung nodules on a limited CTA chest.  #. URI w/ post-infectious cough.  #. BLE neuropathy, ascending.      Will get a dedicated non-contrast CT chest to fully assess the nodules    Follow up imaging based on CT scan results (above)    Recommended OTC antihistamine or nasal steroid spray for upper airway congestion symptoms    Educated on post-infectious cough    Influenza vaccination today    Patient should return to clinic in 6 months for a follow up.    I appreciate the opportunity to participate in the care of Wilfrid Wyman.  Please, feel free to contact me at any time.    Ayala Sanabria MD  Pulmonary and Critical Care   ______________________________________________________________________________    CC: lung nodules    HPI:   Wilfrid Wyman is a 74 y.o. former smoker with history of CAD & obesity, presenting today for evaluation of pulmonary nodules noted on a CT chest  over-read of imaging performed as part of his cardiac work-up.     Patient reports that he is not significantly concerned about the lung nodules and is comfortable with whatever follow-up we advised.  States that he was able to review imaging report on My Chart.  He is more concerned about 6 weeks of cough he has been having.  Reports a significant URI about 3 to 4 weeks ago.  Now he is dealing with very slowly improving cough.  He is coughing up creamy thick tan phlegm.  Complains of sore throat and upper airway congestion that is worse in the morning.  Denies any fevers or night sweats.  Reports that he gets cold 2 or 3 times a year, but the usually last for 10 days.  He is normally physically active, but has been not as active for the last 6 weeks because he usually works out in the morning and his symptoms are at worst in the morning.  Smoker with a 34-pack-year history who quit smoking in .    Patient's unrelated complaint today is that of progressively worsening balance and BLE neuropathy that has been slowly ascending towards his waist over the last couple of decades.  States that he has seen neurology at [facility] in the past.  Is very concerned about potential of falls in the future.    ROS:  Review of Systems - 10 point ROS reviewed and noted to be negative w/ exceptions as detailed in the HPI.    PMH:  Patient Active Problem List    Diagnosis Date Noted   ? Carotid artery disease 2015   ? WESLEY on CPAP - 5cm H20 2015   ? Hyperlipemia    ? Coronary Artery Disease      PSH:  Past Surgical History:   Procedure Laterality Date   ? CARDIAC CATHETERIZATION  11/24/15    stent x 2   ? CORONARY STENT PLACEMENT     ? KNEE ARTHROSCOPY Bilateral      Allergies:  Allergies   Allergen Reactions   ? Penicillins Rash     Family HX:  Family History   Problem Relation Age of Onset   ? Valvular heart disease Mother 70        bicuspid   ? Stroke Mother 75         age 82   ? Acute Myocardial Infarction  Father 60   ? Other Brother          [place]   ? Other Sister         does not know history   ? Obesity Daughter    ? No Medical Problems Daughter    ? No Medical Problems Son        Social Hx:  Social History     Socioeconomic History   ? Marital status:      Spouse name: Not on file   ? Number of children: Not on file   ? Years of education: Not on file   ? Highest education level: Not on file   Occupational History   ? Not on file   Social Needs   ? Financial resource strain: Not on file   ? Food insecurity:     Worry: Not on file     Inability: Not on file   ? Transportation needs:     Medical: Not on file     Non-medical: Not on file   Tobacco Use   ? Smoking status: Former Smoker     Last attempt to quit: 10/22/2000     Years since quittin.9   ? Smokeless tobacco: Never Used   Substance and Sexual Activity   ? Alcohol use: Yes     Comment: 10 drinks/month   ? Drug use: No   ? Sexual activity: Not on file   Lifestyle   ? Physical activity:     Days per week: Not on file     Minutes per session: Not on file   ? Stress: Not on file   Relationships   ? Social connections:     Talks on phone: Not on file     Gets together: Not on file     Attends Anabaptism service: Not on file     Active member of club or organization: Not on file     Attends meetings of clubs or organizations: Not on file     Relationship status: Not on file   ? Intimate partner violence:     Fear of current or ex partner: Not on file     Emotionally abused: Not on file     Physically abused: Not on file     Forced sexual activity: Not on file   Other Topics Concern   ? 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       Current Meds:  Current Outpatient Medications   Medication Sig Dispense Refill   ? aspirin 81 MG EC tablet Take 81 mg by mouth daily.     ? atorvastatin (LIPITOR) 80 MG tablet TAKE 1 TABLET BY MOUTH EVERY NIGHT AT BEDTIME 90 tablet 1   ? omeprazole (PRILOSEC) 20 MG capsule TAKE  "1 CAPSULE BY MOUTH DAILY 90 capsule 5   ? sildenafil (VIAGRA) 50 MG tablet Take 50 mg by mouth daily as needed for erectile dysfunction.       No current facility-administered medications for this visit.      Physical Exam:  /70   Pulse (!) 46   Resp 12   Ht 6' 1\" (1.854 m)   Wt (!) 225 lb (102.1 kg)   SpO2 95%   BMI 29.69 kg/m     Gen: tan elderly  man, alert, oriented, no distress  HEENT: nasal turbinates are erythematous, no oropharyngeal lesions, no cervical or supraclavicular lymphadenopathy; no stridor  CV: RRR, no M/G/R  Resp: equal bilateral air entry, breath sounds clear throughout, no focal crackles or wheezes; able to converse in full sentences w/ no respiratory distress  Abd: soft, nontender, no palpable organomegaly  Skin: no apparent rashes  Ext: no cyanosis, clubbing or edema  Neuro: alert, nonfocal    Labs: personally reviewed in the EMR.    Imaging studies: personally reviewed. Below are the Radiology interpretations.  CTA coronary from 9/19/19 over-read, limited chest view:  LIMITED CHEST: New 5 mm indeterminate nodule medial right lung base on image 34. Stable 3 mm lateral subpleural nodule on image 36. There is a questionable additional small nodule posteriorly on image 44 which is new.  LIMITED MEDIASTINUM: Negative.  LIMITED UPPER ABDOMEN: Fatty infiltration of liver.               "

## 2021-06-20 NOTE — LETTER
Letter by Reena Stewart RN at      Author: Reena Stewart RN Service: -- Author Type: --    Filed:  Encounter Date: 9/10/2020 Status: (Other)         Wilfrid Wyman  [address] [place] [address]      09/10/20      Dear Christine Yuen from the [facility]!  Our records show that you are due next month for a follow-up CT scan of your lungs. Please call us when it is most convenient to schedule your scan.    Location: [facility] [address]                  [place] [address]  Phone:    [phone]     If you need to cancel or reschedule your appointment, please notify us at least 24 hours prior to your appointment time so we are able to make this time available for another patient.     Thank you for choosing [facility] for your health care needs.     Sincerely,  [facility] Staff

## 2021-06-20 NOTE — LETTER
Letter by Reena Stewart RN at      Author: Reena Stewart RN Service: -- Author Type: --    Filed:  Encounter Date: 10/8/2020 Status: (Other)         Wilfrid BRENT Cowartmelouis  [address] [place] [address]      10/08/20      Dear Christine Yuen from the [facility]!  Our records show that you are due for a follow-up CT scan of your lungs. Please call us when it is most convenient to schedule your scan.  Phone:    [phone]     If you need to cancel or reschedule your appointment, please notify us at least 24 hours prior to your appointment time so we are able to make this time available for another patient.     Thank you for choosing [facility] for your health care needs.     Sincerely,  [facility] Staff

## 2021-06-21 NOTE — LETTER
Letter by Reena Stewart RN at      Author: Reena Stewart RN Service: -- Author Type: --    Filed:  Encounter Date: 12/3/2020 Status: (Other)         Wilfrid Wyman  [address] [place] [address]      12/03/20      Dear Wilfrid,    According to our records, you are overdue for a follow-up CT scan of your lungs. We have attempted to reach you via mail and phone without success. Your doctor has ordered the CT scan, it just needs to be scheduled. The lung clinic would be happy to assist you in doing so. If you remain interested please call [phone].  We will make no further attempts to call you to schedule at this point if we do not hear from you and assume you are no longer interested in the screening.     Sincerely,  [facility] ([facility]

## 2021-06-25 NOTE — PROGRESS NOTES
"Progress Notes by Ish Valdes MD at 4/18/2017  8:10 AM     Author: Ish Valdes MD Service: -- Author Type: Physician    Filed: 4/18/2017  8:43 AM Encounter Date: 4/18/2017 Status: Signed    : Ish Valdes MD (Physician)           Click to link to [facility] HEART CARE NOTE    Thank you, Dr. Gonzáles, for asking us to see Wilfrid Wyman at the [facility].      Assessment/Recommendations   Patient with known coronary artery disease who is stable from a functional capacity standpoint.  His LDL cholesterol could come down a bit more nebs suggested he go to 80 mg of Lipitor and recheck in 3 months which he will do.  I have encouraged him to maintain an active lifestyle which I know he will do them to call if he has new changes in his functional capacity or symptoms of chest discomfort.    We will plan on seeing him in one year but of course would be happy to see him sooner if questions or problems arise.         History of Present Illness    Mr. Wilfrid Wyman is a 71 y.o. male known coronary artery disease who had stents placed about a year and a half ago.  He has been feeling well.  He states he feels like a \"million box\".  He has been exercising throughout the winter and will start riding his bicycle outside soon.  He is ordered a new bicycle is coming in May as well.  He denies chest discomfort with physical activity.  For years he has had this intermittent brief feeling of tension in his left chest which he does not even describe is a discomfort but this does not come on with physical activity.  He denies orthopnea, paroxysmal nocturnal dyspnea, peripheral edema, syncope or near syncopal episodes.  Most recent LDL cholesterol was just under 100.  He had a better lipid panel on 80 of Lipitor but has decreased it to 40 mg.    E     Physical Examination Review of Systems   Vitals:    04/18/17 0805   BP: 148/80   Pulse: (!) 48   Resp: 18     Body " mass index is 29.55 kg/(m^2).  Wt Readings from Last 3 Encounters:   17 (!) 224 lb (101.6 kg)   17 (!) 226 lb (102.5 kg)   17 (!) 226 lb (102.5 kg)     General Appearance:   Alert, cooperative and in no acute distress.   ENT/Mouth: Oral mucuos membranes pink and moist .      EYES:  No scleral icterus. No Xanthelasma.    Neck: JVP normal. No Hepatojugular reflux. Thyroid not visualized   Chest/Lungs:   Lungs are clear to auscultation, equal chest wall expansion    Cardiovascular:   S1, S2 with 1/6 systolic murmur , no clicks or rubs. Brachial, radial and posterior tibial pulses are intact and symetric. No carotid bruits noted   Abdomen:  Nontender. BS+.       Extremities: No cyanosis, clubbing or edema   Skin: no xanthelasma, warm.    Psych: Appropriate affect.   Neurologic: normal gait, normal  bilateral, no tremors        General: WNL  Eyes: WNL  Ears/Nose/Throat: WNL  Lungs: WNL  Heart: Chest Pain  Stomach: WNL  Bladder: WNL  Muscle/Joints: Joint Pain, Muscle Pain  Skin: Poor Wound Healing  Nervous System: WNL  Mental Health: WNL     Blood: WNL     Medical History  Surgical History Family History Social History   Past Medical History:   Diagnosis Date   ? Carotid artery occlusion    ? Coronary artery disease    ? ED (erectile dysfunction)    ? Family history of myocardial infarction     father 60   ? GERD (gastroesophageal reflux disease)    ? High cholesterol    ? Hyperlipidemia    ? WESLEY on CPAP     Past Surgical History:   Procedure Laterality Date   ? CARDIAC CATHETERIZATION  11/24/15    stent x 2   ? CORONARY STENT PLACEMENT     ? KNEE ARTHROSCOPY Bilateral     Family History   Problem Relation Age of Onset   ? Valvular heart disease Mother 70     bicuspid   ? Stroke Mother 75      age 82   ? Acute Myocardial Infarction Father 60   ? Other Brother       [place]   ? Other Sister      does not know history   ? Obesity Daughter    ? No Medical Problems Daughter    ? No  Medical Problems Son     Social History     Social History   ? Marital status:      Spouse name: N/A   ? Number of children: N/A   ? Years of education: N/A     Occupational History   ? Not on file.     Social History Main Topics   ? Smoking status: Former Smoker     Quit date: 10/22/2000   ? Smokeless tobacco: Never Used   ? Alcohol use Yes      Comment: 10 drinks/month   ? Drug use: No   ? Sexual activity: Not on file     Other Topics Concern   ? 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            Medications  Allergies   Current Outpatient Prescriptions   Medication Sig Dispense Refill   ? aspirin 81 MG EC tablet Take 81 mg by mouth daily.     ? atorvastatin (LIPITOR) 40 MG tablet Take 2 tablets (80 mg total) by mouth daily. 90 tablet 3   ? clopidogrel (PLAVIX) 75 mg tablet TAKE 1 TABLET BY MOUTH DAILY 90 tablet 0   ? sildenafil (VIAGRA) 50 MG tablet Take 50 mg by mouth daily as needed for erectile dysfunction.     ? omeprazole (PRILOSEC) 20 MG capsule TAKE 1 CAPSULE BY MOUTH DAILY 90 capsule 5     No current facility-administered medications for this visit.       Allergies   Allergen Reactions   ? Penicillins Rash         Lab Results    Chemistry/lipid CBC Cardiac Enzymes/BNP/TSH/INR   Lab Results   Component Value Date    CHOL 165 02/08/2017    HDL 52 02/08/2017    LDLCALC 96 02/08/2017    TRIG 84 02/08/2017    CREATININE 0.84 02/08/2017    BUN 15 02/08/2017    K 4.8 02/08/2017     02/08/2017     (H) 02/08/2017    CO2 23 02/08/2017    Lab Results   Component Value Date    WBC 5.8 02/08/2017    HGB 16.2 02/08/2017    HCT 48.1 02/08/2017    MCV 92 02/08/2017     02/08/2017    Lab Results   Component Value Date    TSH 1.74 11/04/2015

## 2021-06-25 NOTE — TELEPHONE ENCOUNTER
RN cannot approve Refill Request    RN can NOT refill this medication Protocol failed and NO refill given. Last office visit: 9/19/2018 Ish Valdes MD Last Physical: Visit date not found Last MTM visit: Visit date not found Last visit same specialty: 9/19/2018 Ish Valdes MD.  Next visit within 3 mo: Visit date not found  Next physical within 3 mo: Visit date not found      Denise Calhoun, [facility] Triage/Med Refill 5/25/2021    Requested Prescriptions   Pending Prescriptions Disp Refills     atorvastatin (LIPITOR) 80 MG tablet [Pharmacy Med Name: ATORVASTATIN 80MG TABLETS] 90 tablet 3     Sig: TAKE 1 TABLET BY MOUTH AT BEDTIME       There is no refill protocol information for this order

## 2021-06-26 NOTE — PROGRESS NOTES
Progress Notes by Ish Valdes MD at 9/19/2018  2:30 PM     Author: Ish Valdes MD Service: -- Author Type: Physician    Filed: 9/19/2018  3:04 PM Encounter Date: 9/19/2018 Status: Signed    : Ish Valdes MD (Physician)           Click to link to [facility] HEART CARE NOTE    Thank you, Dr. Gonzáles, for asking us to see Wilfrid Wyman at the [facility].      Assessment/Recommendations   Patient with known coronary artery disease who is now more than 2 years out from percutaneous intervention.  He does not have any symptoms and he continues to be active.  I told him he can discontinue his Plavix but continue his aspirin indefinitely.    I have asked him to to new his active lifestyle and to call if he has changes in his functional capacity.  We will plan on seeing him in one year, but of course would be happy to see him sooner if questions or problems arise.    Thank you for allowing us to participate in his care.         History of Present Illness    Mr. Wilfrid Wyman is a 73 y.o. male with known coronary artery disease who had previous stents placed more than 2 years ago.  He is actually been feeling well.  He has gained some weight but continues to exercise on a regular basis, primarily by sickling.  He has not had any chest discomfort and denies orthopnea, paroxysmal nocturnal dyspnea, peripheral edema, syncope or near syncopal episodes.  He has no side effects from his current medications.         Physical Examination Review of Systems   Vitals:    09/19/18 1429   BP: 142/78   Pulse: 60   Resp: 14     Body mass index is 29.29 kg/(m^2).  Wt Readings from Last 3 Encounters:   09/19/18 222 lb (100.7 kg)   04/18/17 (!) 224 lb (101.6 kg)   03/02/17 (!) 226 lb (102.5 kg)     General Appearance:   Alert, cooperative and in no acute distress.   ENT/Mouth: Oral mucuos membranes pink and moist .      EYES:  No scleral icterus. No Xanthelasma.     Neck: JVP normal. No Hepatojugular reflux. Thyroid not visualized   Chest/Lungs:   Lungs are clear to auscultation, equal chest wall expansion    Cardiovascular:   S1, S2 without murmur ,clicks or rubs. Brachial, radial and posterior tibial pulses are intact and symetric. No carotid bruits noted   Abdomen:  Nontender. BS+.       Extremities: No cyanosis, clubbing or edema   Skin: no xanthelasma, warm.    Psych: Appropriate affect.   Neurologic: normal gait, normal  bilateral, no tremors        General: Weight Gain  Eyes: WNL  Ears/Nose/Throat: WNL  Lungs: WNL  Heart: Chest Pain  Stomach: WNL  Bladder: WNL  Muscle/Joints: WNL  Skin: Poor Wound Healing  Nervous System: Daytime Sleepiness, Loss of Balance  Mental Health: WNL     Blood: Easy Bleeding, Easy Bruising     Medical History  Surgical History Family History Social History   Past Medical History:   Diagnosis Date   ? Carotid artery occlusion    ? Coronary artery disease    ? ED (erectile dysfunction)    ? Family history of myocardial infarction     father 60   ? GERD (gastroesophageal reflux disease)    ? High cholesterol    ? Hyperlipidemia    ? WESLEY on CPAP     Past Surgical History:   Procedure Laterality Date   ? CARDIAC CATHETERIZATION  11/24/15    stent x 2   ? CORONARY STENT PLACEMENT     ? KNEE ARTHROSCOPY Bilateral     Family History   Problem Relation Age of Onset   ? Valvular heart disease Mother 70     bicuspid   ? Stroke Mother 75      age 82   ? Acute Myocardial Infarction Father 60   ? Other Brother       [place]   ? Other Sister      does not know history   ? Obesity Daughter    ? No Medical Problems Daughter    ? No Medical Problems Son     Social History     Social History   ? Marital status:      Spouse name: N/A   ? Number of children: N/A   ? Years of education: N/A     Occupational History   ? Not on file.     Social History Main Topics   ? Smoking status: Former Smoker     Quit date: 10/22/2000   ? Smokeless  tobacco: Never Used   ? Alcohol use Yes      Comment: 10 drinks/month   ? Drug use: No   ? Sexual activity: Not on file     Other Topics Concern   ? 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            Medications  Allergies   Current Outpatient Prescriptions   Medication Sig Dispense Refill   ? aspirin 81 MG EC tablet Take 81 mg by mouth daily.     ? atorvastatin (LIPITOR) 80 MG tablet TAKE 1 TABLET BY MOUTH EVERY NIGHT AT BEDTIME 90 tablet 1   ? omeprazole (PRILOSEC) 20 MG capsule TAKE 1 CAPSULE BY MOUTH DAILY 90 capsule 5   ? omeprazole (PRILOSEC) 20 MG capsule TAKE ONE CAPSULE BY MOUTH ONCE DAILY 90 capsule 0   ? sildenafil (VIAGRA) 50 MG tablet Take 50 mg by mouth daily as needed for erectile dysfunction.       No current facility-administered medications for this visit.       Allergies   Allergen Reactions   ? Penicillins Rash         Lab Results    Chemistry/lipid CBC Cardiac Enzymes/BNP/TSH/INR   Lab Results   Component Value Date    CHOL 173 09/27/2017    HDL 49 09/27/2017    LDLCALC 99 09/27/2017    TRIG 124 09/27/2017    CREATININE 0.84 02/08/2017    BUN 15 02/08/2017    K 4.8 02/08/2017     02/08/2017     (H) 02/08/2017    CO2 23 02/08/2017    Lab Results   Component Value Date    WBC 5.8 02/08/2017    HGB 16.2 02/08/2017    HCT 48.1 02/08/2017    MCV 92 02/08/2017     02/08/2017    Lab Results   Component Value Date    TSH 1.74 11/04/2015

## 2021-06-29 NOTE — PROGRESS NOTES
"Progress Notes by Ish Valdes MD at 5/6/2020  7:50 AM     Author: Ish Valdes MD Service: -- Author Type: Physician    Filed: 5/6/2020  9:12 AM Encounter Date: 5/6/2020 Status: Signed    : Ish Valdes MD (Physician)           The patient has been notified of following:     \"This telephone visit will be conducted via a call between you and your physician/provider. We have found that certain health care needs can be provided without the need for a physical exam.  This service lets us provide the care you need with a phone conversation.  If a prescription is necessary we can send it directly to your pharmacy.  If lab work is needed we can place an order for that and you can then stop by our lab to have the test done at a later time. If during the course of the call the physician/provider feels a telephone visit is not appropriate, you will not be charged for this service.\" Verbal consent has been obtained for this service by care team member:         HEART CARE PHONE ENCOUNTER        The patient has chosen to have the visit conducted as a telephone visit, to reduce risk of exposure given the current status of Coronavirus in our community. This telephone visit is being conducted via a call between the patient and physician/provider. Health care needs are being provided without a physical exam.     Assessment/Recommendations   Assessment:    Patient with known coronary artery disease, status post percutaneous intervention of the circumflex system in 2015.  He had a normal stress echocardiogram in 2017 and a CTA done and 2019 which showed patent coronary arteries and possible mild in-stent restenosis.  He is continued to have some discomfort in his left chest which is there about 50% of the time.  I do not think it is cardiac in etiology but he is a vigorous exerciser and would like to do 100 mile bike ride if possible so we will get a stress echocardiogram as a follow-up to his coronary artery " disease and chest discomfort.    We will renew his medications and see him on an annual basis but of course would be happy to see him sooner if questions or problems arise.  I commended him on his exercise routine and recommended that he continue this as I know he will.    Thank you for allowing us to participate in his care.      I have reviewed the note as documented.  This accurately captures the substance of my conversation with the patient.    Total time of call between patient and provider was 13 minutes   Start Time: 8:05 AM  Stop Time: 8:18 AM       History of Present Illness/Subjective    Wilfrid Wyman is a 74 y.o. male who is being evaluated via a billable telephone visit.  Patient with known coronary artery disease status post percutaneous intervention in 2015.  He has been feeling reasonably well of late.  He continues to have a discomfort in his left chest that said about 50% of the time.  Its more toward the surface but does not hurt more if he twists or turns.  It is not pleuritic in nature.  It does not stop him from exercising and he does not notice it as much when he is exercising.  He has had it for quite some time, at least does much is 1 year.  He did have a CTA done in 2019 which showed patency of his coronary arteries and the possibility of mild in-stent restenosis.    He denies orthopnea, paroxysmal nocturnal dyspnea, peripheral edema, syncope or near syncopal episode and also denies palpitations.  He has been exercising on a regular basis and is done about 10 or 15 bike rides this spring.  He also lifts weights.  LDL cholesterol is due to be rechecked but the last one was 74.      I have reviewed and updated the patient's Past Medical History, Social History, Family History and Medication List.     Physical Examination not performed given phone encounter Review of Systems                                                Medical History  Surgical History Family History Social History    Past Medical History:   Diagnosis Date   ? Carotid artery occlusion    ? Coronary artery disease    ? ED (erectile dysfunction)    ? Family history of myocardial infarction     father 60   ? GERD (gastroesophageal reflux disease)    ? High cholesterol    ? Hyperlipidemia    ? WESLEY on CPAP     Past Surgical History:   Procedure Laterality Date   ? CARDIAC CATHETERIZATION  11/24/15    stent x 2   ? CORONARY STENT PLACEMENT     ? KNEE ARTHROSCOPY Bilateral     Family History   Problem Relation Age of Onset   ? Valvular heart disease Mother 70        bicuspid   ? Stroke Mother 75         age 82   ? Acute Myocardial Infarction Father 60   ? Other Brother          [place]   ? Other Sister         does not know history   ? Obesity Daughter    ? No Medical Problems Daughter    ? No Medical Problems Son     Social History     Socioeconomic History   ? Marital status:      Spouse name: Not on file   ? Number of children: Not on file   ? Years of education: Not on file   ? Highest education level: Not on file   Occupational History   ? Not on file   Social Needs   ? Financial resource strain: Not on file   ? Food insecurity     Worry: Not on file     Inability: Not on file   ? Transportation needs     Medical: Not on file     Non-medical: Not on file   Tobacco Use   ? Smoking status: Former Smoker     Last attempt to quit: 10/22/2000     Years since quittin.5   ? Smokeless tobacco: Never Used   Substance and Sexual Activity   ? Alcohol use: Yes     Comment: 10 drinks/month   ? Drug use: No   ? Sexual activity: Not on file   Lifestyle   ? Physical activity     Days per week: Not on file     Minutes per session: Not on file   ? Stress: Not on file   Relationships   ? Social connections     Talks on phone: Not on file     Gets together: Not on file     Attends Alevism service: Not on file     Active member of club or organization: Not on file     Attends meetings of clubs or organizations: Not on  file     Relationship status: Not on file   ? Intimate partner violence     Fear of current or ex partner: Not on file     Emotionally abused: Not on file     Physically abused: Not on file     Forced sexual activity: Not on file   Other Topics Concern   ? 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            Medications  Allergies   Current Outpatient Medications   Medication Sig Dispense Refill   ? aspirin 81 MG EC tablet Take 81 mg by mouth daily.     ? atorvastatin (LIPITOR) 80 MG tablet Take 1 tablet (80 mg total) by mouth at bedtime. 90 tablet 3   ? miscellaneous medical supply [facility] CPAP machine for home use at pressure: 7cmw,  nasal mask with cushion x 1,     ? sildenafiL (VIAGRA) 50 MG tablet Take 1 tablet (50 mg total) by mouth daily as needed for erectile dysfunction. 10 tablet 5     No current facility-administered medications for this visit.     Allergies   Allergen Reactions   ? Penicillins Rash         Lab Results    Chemistry/lipid CBC Cardiac Enzymes/BNP/TSH/INR   Lab Results   Component Value Date    CHOL 139 05/21/2019    HDL 50 05/21/2019    LDLCALC 74 05/21/2019    TRIG 75 05/21/2019    CREATININE 0.80 05/21/2019    BUN 21 05/21/2019    K 4.4 05/21/2019     05/21/2019     (H) 05/21/2019    CO2 24 05/21/2019    Lab Results   Component Value Date    WBC 6.0 05/21/2019    HGB 15.5 05/21/2019    HCT 46.0 05/21/2019    MCV 90 05/21/2019     05/21/2019    Lab Results   Component Value Date    TSH 1.20 05/21/2019        Ish Valdes

## 2021-08-15 ENCOUNTER — HEALTH MAINTENANCE LETTER (OUTPATIENT)
Age: 76
End: 2021-08-15

## 2021-10-11 ENCOUNTER — HEALTH MAINTENANCE LETTER (OUTPATIENT)
Age: 76
End: 2021-10-11

## 2022-08-16 DIAGNOSIS — I25.10 CAD (CORONARY ARTERY DISEASE): ICD-10-CM

## 2022-08-16 NOTE — TELEPHONE ENCOUNTER
"Routing refill request to provider for review/approval because:  Labs not current:  ldl  Patient needs to be seen because it has been more than 1 year since last office visit.    Last Written Prescription Date:  5/26/21  Last Fill Quantity: 90,  # refills: 3   Last office visit provider:  12/16/20     Requested Prescriptions   Pending Prescriptions Disp Refills     atorvastatin (LIPITOR) 80 MG tablet [Pharmacy Med Name: ATORVASTATIN 80MG TABLETS] 90 tablet 3     Sig: TAKE 1 TABLET BY MOUTH AT BEDTIME       Statins Protocol Failed - 8/16/2022  7:11 AM        Failed - LDL on file in past 12 months     Recent Labs   Lab Test 05/21/19  1140   LDL 74             Failed - Recent (12 mo) or future (30 days) visit within the authorizing provider's specialty     Patient has had an office visit with the authorizing provider or a provider within the authorizing providers department within the previous 12 mos or has a future within next 30 days. See \"Patient Info\" tab in inbasket, or \"Choose Columns\" in Meds & Orders section of the refill encounter.              Passed - No abnormal creatine kinase in past 12 months     No lab results found.             Passed - Medication is active on med list        Passed - Patient is age 18 or older             Siobhan Santo RN 08/16/22 5:29 PM  "

## 2022-08-17 RX ORDER — ATORVASTATIN CALCIUM 80 MG/1
TABLET, FILM COATED ORAL
Qty: 90 TABLET | Refills: 3 | Status: SHIPPED | OUTPATIENT
Start: 2022-08-17 | End: 2023-08-28

## 2022-09-24 ENCOUNTER — HEALTH MAINTENANCE LETTER (OUTPATIENT)
Age: 77
End: 2022-09-24

## 2023-03-15 ENCOUNTER — OFFICE VISIT (OUTPATIENT)
Dept: CARDIOLOGY | Facility: CLINIC | Age: 78
End: 2023-03-15
Payer: MEDICARE

## 2023-03-15 VITALS
HEART RATE: 74 BPM | SYSTOLIC BLOOD PRESSURE: 106 MMHG | WEIGHT: 229.7 LBS | OXYGEN SATURATION: 96 % | DIASTOLIC BLOOD PRESSURE: 62 MMHG | RESPIRATION RATE: 16 BRPM | BODY MASS INDEX: 30.31 KG/M2

## 2023-03-15 DIAGNOSIS — I25.10 ATHEROSCLEROSIS OF NATIVE CORONARY ARTERY OF NATIVE HEART WITHOUT ANGINA PECTORIS: Primary | ICD-10-CM

## 2023-03-15 DIAGNOSIS — M54.2 NECK PAIN: ICD-10-CM

## 2023-03-15 DIAGNOSIS — R73.01 IMPAIRED FASTING GLUCOSE: ICD-10-CM

## 2023-03-15 DIAGNOSIS — G47.33 OSA ON CPAP: ICD-10-CM

## 2023-03-15 LAB
ALBUMIN SERPL BCG-MCNC: 4 G/DL (ref 3.5–5.2)
ALP SERPL-CCNC: 121 U/L (ref 40–129)
ALT SERPL W P-5'-P-CCNC: 24 U/L (ref 10–50)
ANION GAP SERPL CALCULATED.3IONS-SCNC: 10 MMOL/L (ref 7–15)
AST SERPL W P-5'-P-CCNC: 25 U/L (ref 10–50)
BILIRUB SERPL-MCNC: 0.4 MG/DL
BUN SERPL-MCNC: 18.1 MG/DL (ref 8–23)
CALCIUM SERPL-MCNC: 9.6 MG/DL (ref 8.8–10.2)
CHLORIDE SERPL-SCNC: 109 MMOL/L (ref 98–107)
CHOLEST SERPL-MCNC: 137 MG/DL
CREAT SERPL-MCNC: 0.79 MG/DL (ref 0.67–1.17)
DEPRECATED HCO3 PLAS-SCNC: 22 MMOL/L (ref 22–29)
ERYTHROCYTE [DISTWIDTH] IN BLOOD BY AUTOMATED COUNT: 13.9 % (ref 10–15)
GFR SERPL CREATININE-BSD FRML MDRD: >90 ML/MIN/1.73M2
GLUCOSE SERPL-MCNC: 93 MG/DL (ref 70–99)
HBA1C MFR BLD: 5.7 % (ref 0–5.6)
HCT VFR BLD AUTO: 47.6 % (ref 40–53)
HDLC SERPL-MCNC: 51 MG/DL
HGB BLD-MCNC: 15.8 G/DL (ref 13.3–17.7)
LDLC SERPL CALC-MCNC: 60 MG/DL
MCH RBC QN AUTO: 29.9 PG (ref 26.5–33)
MCHC RBC AUTO-ENTMCNC: 33.2 G/DL (ref 31.5–36.5)
MCV RBC AUTO: 90 FL (ref 78–100)
NONHDLC SERPL-MCNC: 86 MG/DL
PLATELET # BLD AUTO: 213 10E3/UL (ref 150–450)
POTASSIUM SERPL-SCNC: 4.6 MMOL/L (ref 3.4–5.3)
PROT SERPL-MCNC: 6.8 G/DL (ref 6.4–8.3)
RBC # BLD AUTO: 5.29 10E6/UL (ref 4.4–5.9)
SODIUM SERPL-SCNC: 141 MMOL/L (ref 136–145)
TRIGL SERPL-MCNC: 131 MG/DL
WBC # BLD AUTO: 6.5 10E3/UL (ref 4–11)

## 2023-03-15 PROCEDURE — 85027 COMPLETE CBC AUTOMATED: CPT | Performed by: INTERNAL MEDICINE

## 2023-03-15 PROCEDURE — 80053 COMPREHEN METABOLIC PANEL: CPT | Performed by: INTERNAL MEDICINE

## 2023-03-15 PROCEDURE — 80061 LIPID PANEL: CPT | Performed by: INTERNAL MEDICINE

## 2023-03-15 PROCEDURE — 83036 HEMOGLOBIN GLYCOSYLATED A1C: CPT | Performed by: INTERNAL MEDICINE

## 2023-03-15 PROCEDURE — 36415 COLL VENOUS BLD VENIPUNCTURE: CPT | Performed by: INTERNAL MEDICINE

## 2023-03-15 PROCEDURE — 99214 OFFICE O/P EST MOD 30 MIN: CPT | Mod: 25 | Performed by: INTERNAL MEDICINE

## 2023-03-15 NOTE — LETTER
3/15/2023    Ish Gonzáles MD  [address] [facility] [address]    RE: Wilfrid Wyman       Dear Colleague,     I had the pleasure of seeing Wilfrid Wyman in the [facility].      [facility]   [phone]      Assessment/Recommendations   Patient with known coronary artery disease with distant percutaneous intervention.  He has some neck discomfort which is atypical but I certainly cannot exclude the possibility of a cardiac etiology.  He wants to exercise more vigorously and bike ride this summer and I would like to do a stress echocardiogram to evaluate for myocardial ischemia.  He also has a soft heart murmur which I think we can evaluate with the screening echocardiogram as well.    He has not had lab work for quite some time and it has been about 3 years since he seen a physician.  We will get a fasting lipid panel today, comprehensive metabolic panel, CBC, hemoglobin A1c.  These will be ready for his primary care physician as well.    Blood pressure is at goal and we will see what his LDL cholesterol is in regard to his hyperlipidemia.    We will get back to him with the results of the testing and any further recommendations.    Thank you for allowing us to participate in his care.       History of Present Illness/Subjective    Mr. Wilfrid Wyman is a 77 year old male with known history of coronary artery disease with distant history of percutaneous intervention with stent placement.  He has done well over the years but has had some chronic discomfort in his chest which she is not complaining of today.  He has had some neck discomfort which is kind of a pressure sensation in his neck.  It does not get worse with physical activity and is rather constant in nature.  Is not associate with shortness of breath or diaphoresis.  He exercises about 30 minutes on elliptical within the last week and actually felt better while he was doing that.    He denies  orthopnea, paroxysmal nocturnal dyspnea, peripheral edema, syncope, palpitations.  He has not had a lipid panel in quite some time.  He wonders if the statins are causing him a little bit of achiness but overall feels good and does not want to stop the statins.       Physical Examination Review of Systems   /62   Pulse 74   Resp 16   Wt 104.2 kg (229 lb 11.2 oz)   SpO2 96%   BMI 30.31 kg/m    Body mass index is 30.31 kg/m .  Wt Readings from Last 3 Encounters:   03/15/23 104.2 kg (229 lb 11.2 oz)   05/06/20 104.3 kg (230 lb)   04/16/20 104.3 kg (230 lb)     General Appearance:   Alert, cooperative and in no acute distress.   ENT/Mouth: Patient wearing a mask.      EYES:  no scleral icterus, normal conjunctivae   Neck: JVP normal. No Hepatojugular reflux. Thyroid not visualized.   Chest/Lungs:   Lungs are clear to auscultation, equal chest wall expansion.   Cardiovascular:   S1, S2 without murmur ,clicks or rubs. Brachial, radial and posterior tibial pulses are intact and symetric. No carotid bruits noted   Abdomen:  Nontender.    Extremities: No cyanosis, clubbing or edema   Skin: no xanthelasma, warm.    Neurologic: normal arm movement bilateral, no tremors     Psychiatric: Appropriate affect.      Enc Vitals  BP: 106/62  Pulse: 74  Resp: 16  SpO2: 96 %  Weight: 104.2 kg (229 lb 11.2 oz)                                           Medical History  Surgical History Family History Social History   Past Medical History:   Diagnosis Date     Carotid artery occlusion      Coronary artery disease      ED (erectile dysfunction)      Family history of myocardial infarction     father 60     GERD (gastroesophageal reflux disease)      High cholesterol      Hyperlipidemia      WESLEY on CPAP     Past Surgical History:   Procedure Laterality Date     ARTHROSCOPY KNEE Bilateral      CARDIAC CATHETERIZATION  11/24/15    stent x 2     CORONARY STENT PLACEMENT      Family History   Problem Relation Age of Onset     Valvular  heart disease Mother 70.00        bicuspid     Cerebrovascular Disease Mother 75.00         age 82     Acute Myocardial Infarction Father 60.00     Other - See Comments Brother          [place]     Other - See Comments Sister         does not know history     Obesity Daughter      No Known Problems Daughter      No Known Problems Son     Social History     Socioeconomic History     Marital status:      Spouse name: Not on file     Number of children: Not on file     Years of education: Not on file     Highest education level: Not on file   Occupational History     Not on file   Tobacco Use     Smoking status: Former     Types: Cigarettes     Quit date: 10/22/2000     Years since quittin.4     Smokeless tobacco: Never   Substance and Sexual Activity     Alcohol use: Yes     Comment: Alcoholic Drinks/day: 10 drinks/month     Drug use: No     Sexual activity: Not on file   Other Topics Concern     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       Social Determinants of Health     Financial Resource Strain: Not on file   Food Insecurity: Not on file   Transportation Needs: Not on file   Physical Activity: Not on file   Stress: Not on file   Social Connections: Not on file   Intimate Partner Violence: Not on file   Housing Stability: Not on file          Medications  Allergies   Current Outpatient Medications   Medication Sig Dispense Refill     aspirin 81 MG EC tablet [ASPIRIN 81 MG EC TABLET] Take 81 mg by mouth daily.       atorvastatin (LIPITOR) 80 MG tablet TAKE 1 TABLET BY MOUTH AT BEDTIME 90 tablet 3     miscellaneous medical supply Misc [MISCELLANEOUS MEDICAL SUPPLY MISC] CPAP machine for home use at pressure: 7cmw,  nasal mask with cushion x 1,       sildenafiL (VIAGRA) 50 MG tablet [SILDENAFIL (VIAGRA) 50 MG TABLET] Take 1 tablet (50 mg total) by mouth daily as needed for erectile dysfunction. 10 tablet 5    Allergies   Allergen Reactions      Penicillins Rash         Lab Results    Chemistry/lipid CBC Cardiac Enzymes/BNP/TSH/INR   Lab Results   Component Value Date    CHOL 139 05/21/2019    HDL 50 05/21/2019    TRIG 75 05/21/2019    BUN 21 05/21/2019     05/21/2019    CO2 24 05/21/2019    Lab Results   Component Value Date    WBC 6.0 05/21/2019    HGB 15.5 05/21/2019    HCT 46.0 05/21/2019    MCV 90 05/21/2019     05/21/2019    Lab Results   Component Value Date    TSH 1.20 05/21/2019                Thank you for allowing me to participate in the care of your patient.      Sincerely,     Ish Valdes MD     [facility]  cc:   No referring provider defined for this encounter.

## 2023-03-15 NOTE — PROGRESS NOTES
[facility]   [phone]      Assessment/Recommendations   Patient with known coronary artery disease with distant percutaneous intervention.  He has some neck discomfort which is atypical but I certainly cannot exclude the possibility of a cardiac etiology.  He wants to exercise more vigorously and bike ride this summer and I would like to do a stress echocardiogram to evaluate for myocardial ischemia.  He also has a soft heart murmur which I think we can evaluate with the screening echocardiogram as well.    He has not had lab work for quite some time and it has been about 3 years since he seen a physician.  We will get a fasting lipid panel today, comprehensive metabolic panel, CBC, hemoglobin A1c.  These will be ready for his primary care physician as well.    Blood pressure is at goal and we will see what his LDL cholesterol is in regard to his hyperlipidemia.    We will get back to him with the results of the testing and any further recommendations.    Thank you for allowing us to participate in his care.       History of Present Illness/Subjective    Mr. Wilfrid Wyman is a 77 year old male with known history of coronary artery disease with distant history of percutaneous intervention with stent placement.  He has done well over the years but has had some chronic discomfort in his chest which she is not complaining of today.  He has had some neck discomfort which is kind of a pressure sensation in his neck.  It does not get worse with physical activity and is rather constant in nature.  Is not associate with shortness of breath or diaphoresis.  He exercises about 30 minutes on elliptical within the last week and actually felt better while he was doing that.    He denies orthopnea, paroxysmal nocturnal dyspnea, peripheral edema, syncope, palpitations.  He has not had a lipid panel in quite some time.  He wonders if the statins are causing him a little bit of achiness but overall feels  good and does not want to stop the statins.       Physical Examination Review of Systems   /62   Pulse 74   Resp 16   Wt 104.2 kg (229 lb 11.2 oz)   SpO2 96%   BMI 30.31 kg/m    Body mass index is 30.31 kg/m .  Wt Readings from Last 3 Encounters:   03/15/23 104.2 kg (229 lb 11.2 oz)   20 104.3 kg (230 lb)   20 104.3 kg (230 lb)     General Appearance:   Alert, cooperative and in no acute distress.   ENT/Mouth: Patient wearing a mask.      EYES:  no scleral icterus, normal conjunctivae   Neck: JVP normal. No Hepatojugular reflux. Thyroid not visualized.   Chest/Lungs:   Lungs are clear to auscultation, equal chest wall expansion.   Cardiovascular:   S1, S2 without murmur ,clicks or rubs. Brachial, radial and posterior tibial pulses are intact and symetric. No carotid bruits noted   Abdomen:  Nontender.    Extremities: No cyanosis, clubbing or edema   Skin: no xanthelasma, warm.    Neurologic: normal arm movement bilateral, no tremors     Psychiatric: Appropriate affect.      Enc Vitals  BP: 106/62  Pulse: 74  Resp: 16  SpO2: 96 %  Weight: 104.2 kg (229 lb 11.2 oz)                                           Medical History  Surgical History Family History Social History   Past Medical History:   Diagnosis Date     Carotid artery occlusion      Coronary artery disease      ED (erectile dysfunction)      Family history of myocardial infarction     father 60     GERD (gastroesophageal reflux disease)      High cholesterol      Hyperlipidemia      WESLEY on CPAP     Past Surgical History:   Procedure Laterality Date     ARTHROSCOPY KNEE Bilateral      CARDIAC CATHETERIZATION  11/24/15    stent x 2     CORONARY STENT PLACEMENT      Family History   Problem Relation Age of Onset     Valvular heart disease Mother 70.00        bicuspid     Cerebrovascular Disease Mother 75.00         age 82     Acute Myocardial Infarction Father 60.00     Other - See Comments Brother          [place]      Other - See Comments Sister         does not know history     Obesity Daughter      No Known Problems Daughter      No Known Problems Son     Social History     Socioeconomic History     Marital status:      Spouse name: Not on file     Number of children: Not on file     Years of education: Not on file     Highest education level: Not on file   Occupational History     Not on file   Tobacco Use     Smoking status: Former     Types: Cigarettes     Quit date: 10/22/2000     Years since quittin.4     Smokeless tobacco: Never   Substance and Sexual Activity     Alcohol use: Yes     Comment: Alcoholic Drinks/day: 10 drinks/month     Drug use: No     Sexual activity: Not on file   Other Topics Concern     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       Social Determinants of Health     Financial Resource Strain: Not on file   Food Insecurity: Not on file   Transportation Needs: Not on file   Physical Activity: Not on file   Stress: Not on file   Social Connections: Not on file   Intimate Partner Violence: Not on file   Housing Stability: Not on file          Medications  Allergies   Current Outpatient Medications   Medication Sig Dispense Refill     aspirin 81 MG EC tablet [ASPIRIN 81 MG EC TABLET] Take 81 mg by mouth daily.       atorvastatin (LIPITOR) 80 MG tablet TAKE 1 TABLET BY MOUTH AT BEDTIME 90 tablet 3     miscellaneous medical supply Misc [MISCELLANEOUS MEDICAL SUPPLY MISC] CPAP machine for home use at pressure: 7cmw,  nasal mask with cushion x 1,       sildenafiL (VIAGRA) 50 MG tablet [SILDENAFIL (VIAGRA) 50 MG TABLET] Take 1 tablet (50 mg total) by mouth daily as needed for erectile dysfunction. 10 tablet 5    Allergies   Allergen Reactions     Penicillins Rash         Lab Results    Chemistry/lipid CBC Cardiac Enzymes/BNP/TSH/INR   Lab Results   Component Value Date    CHOL 139 2019    HDL 50 2019    TRIG 75 2019    BUN 21 2019    NA  141 05/21/2019    CO2 24 05/21/2019    Lab Results   Component Value Date    WBC 6.0 05/21/2019    HGB 15.5 05/21/2019    HCT 46.0 05/21/2019    MCV 90 05/21/2019     05/21/2019    Lab Results   Component Value Date    TSH 1.20 05/21/2019

## 2023-08-28 DIAGNOSIS — I25.10 CAD (CORONARY ARTERY DISEASE): ICD-10-CM

## 2023-08-29 RX ORDER — ATORVASTATIN CALCIUM 80 MG/1
80 TABLET, FILM COATED ORAL AT BEDTIME
Qty: 90 TABLET | Refills: 4 | Status: SHIPPED | OUTPATIENT
Start: 2023-08-29

## 2023-08-29 NOTE — TELEPHONE ENCOUNTER
"Routing refill request to provider for review/approval because:  Patient needs to be seen because it has been more than 1 year since last office visit.    Last Written Prescription Date:  08/17/2022  Last Fill Quantity: 90 tabs,  # refills: 3   Last office visit provider:  12/16/2020    Requested Prescriptions   Pending Prescriptions Disp Refills    atorvastatin (LIPITOR) 80 MG tablet 90 tablet 3     Sig: Take 1 tablet (80 mg) by mouth At Bedtime       Statins Protocol Failed - 8/28/2023  1:46 PM        Failed - Recent (12 mo) or future (30 days) visit within the authorizing provider's specialty     Patient has had an office visit with the authorizing provider or a provider within the authorizing providers department within the previous 12 mos or has a future within next 30 days. See \"Patient Info\" tab in inbasket, or \"Choose Columns\" in Meds & Orders section of the refill encounter.              Passed - LDL on file in past 12 months     Recent Labs   Lab Test 03/15/23  0826   LDL 60             Passed - No abnormal creatine kinase in past 12 months     No lab results found.             Passed - Medication is active on med list        Passed - Patient is age 18 or older             Esther Jain RN 08/29/23 5:49 AM  "

## 2023-10-14 ENCOUNTER — HEALTH MAINTENANCE LETTER (OUTPATIENT)
Age: 78
End: 2023-10-14

## 2024-02-04 SDOH — HEALTH STABILITY: PHYSICAL HEALTH: ON AVERAGE, HOW MANY MINUTES DO YOU ENGAGE IN EXERCISE AT THIS LEVEL?: 60 MIN

## 2024-02-04 SDOH — HEALTH STABILITY: PHYSICAL HEALTH: ON AVERAGE, HOW MANY DAYS PER WEEK DO YOU ENGAGE IN MODERATE TO STRENUOUS EXERCISE (LIKE A BRISK WALK)?: 4 DAYS

## 2024-02-04 ASSESSMENT — SOCIAL DETERMINANTS OF HEALTH (SDOH): HOW OFTEN DO YOU GET TOGETHER WITH FRIENDS OR RELATIVES?: TWICE A WEEK

## 2024-02-05 ENCOUNTER — OFFICE VISIT (OUTPATIENT)
Dept: INTERNAL MEDICINE | Facility: CLINIC | Age: 79
End: 2024-02-05
Payer: MEDICARE

## 2024-02-05 VITALS
SYSTOLIC BLOOD PRESSURE: 135 MMHG | RESPIRATION RATE: 16 BRPM | TEMPERATURE: 97.9 F | OXYGEN SATURATION: 98 % | WEIGHT: 223 LBS | HEIGHT: 73 IN | BODY MASS INDEX: 29.55 KG/M2 | HEART RATE: 46 BPM | DIASTOLIC BLOOD PRESSURE: 79 MMHG

## 2024-02-05 DIAGNOSIS — E78.00 PURE HYPERCHOLESTEROLEMIA: ICD-10-CM

## 2024-02-05 DIAGNOSIS — R01.1 UNDIAGNOSED CARDIAC MURMURS: ICD-10-CM

## 2024-02-05 DIAGNOSIS — Z11.59 NEED FOR HEPATITIS C SCREENING TEST: ICD-10-CM

## 2024-02-05 DIAGNOSIS — I71.21 ANEURYSM OF ASCENDING AORTA WITHOUT RUPTURE (H): ICD-10-CM

## 2024-02-05 DIAGNOSIS — K21.00 GASTROESOPHAGEAL REFLUX DISEASE WITH ESOPHAGITIS WITHOUT HEMORRHAGE: ICD-10-CM

## 2024-02-05 DIAGNOSIS — R00.1 BRADYCARDIA: ICD-10-CM

## 2024-02-05 DIAGNOSIS — M48.02 CERVICAL STENOSIS OF SPINAL CANAL: ICD-10-CM

## 2024-02-05 DIAGNOSIS — I25.10 ATHEROSCLEROSIS OF NATIVE CORONARY ARTERY OF NATIVE HEART WITHOUT ANGINA PECTORIS: ICD-10-CM

## 2024-02-05 DIAGNOSIS — R73.9 HYPERGLYCEMIA: ICD-10-CM

## 2024-02-05 DIAGNOSIS — R42 VERTIGO: ICD-10-CM

## 2024-02-05 DIAGNOSIS — E53.8 VITAMIN B12 DEFICIENCY (NON ANEMIC): ICD-10-CM

## 2024-02-05 DIAGNOSIS — G47.33 OSA ON CPAP: ICD-10-CM

## 2024-02-05 DIAGNOSIS — G62.9 POLYNEUROPATHY: ICD-10-CM

## 2024-02-05 DIAGNOSIS — I65.21 STENOSIS OF RIGHT CAROTID ARTERY: ICD-10-CM

## 2024-02-05 DIAGNOSIS — Z12.5 SCREENING FOR PROSTATE CANCER: ICD-10-CM

## 2024-02-05 DIAGNOSIS — Z00.00 ANNUAL PHYSICAL EXAM: Primary | ICD-10-CM

## 2024-02-05 LAB
ALBUMIN SERPL BCG-MCNC: 4.2 G/DL (ref 3.5–5.2)
ALBUMIN UR-MCNC: NEGATIVE MG/DL
ALP SERPL-CCNC: 122 U/L (ref 40–150)
ALT SERPL W P-5'-P-CCNC: 30 U/L (ref 0–70)
ANION GAP SERPL CALCULATED.3IONS-SCNC: 8 MMOL/L (ref 7–15)
APPEARANCE UR: CLEAR
AST SERPL W P-5'-P-CCNC: 33 U/L (ref 0–45)
ATRIAL RATE - MUSE: 46 BPM
BACTERIA #/AREA URNS HPF: ABNORMAL /HPF
BILIRUB SERPL-MCNC: 0.7 MG/DL
BILIRUB UR QL STRIP: NEGATIVE
BUN SERPL-MCNC: 16.7 MG/DL (ref 8–23)
CALCIUM SERPL-MCNC: 9.9 MG/DL (ref 8.8–10.2)
CHLORIDE SERPL-SCNC: 106 MMOL/L (ref 98–107)
CHOLEST SERPL-MCNC: 151 MG/DL
COLOR UR AUTO: YELLOW
CREAT SERPL-MCNC: 0.8 MG/DL (ref 0.67–1.17)
DEPRECATED HCO3 PLAS-SCNC: 25 MMOL/L (ref 22–29)
DIASTOLIC BLOOD PRESSURE - MUSE: NORMAL MMHG
EGFRCR SERPLBLD CKD-EPI 2021: >90 ML/MIN/1.73M2
ERYTHROCYTE [DISTWIDTH] IN BLOOD BY AUTOMATED COUNT: 13.9 % (ref 10–15)
FASTING STATUS PATIENT QL REPORTED: NO
GLUCOSE SERPL-MCNC: 90 MG/DL (ref 70–99)
GLUCOSE UR STRIP-MCNC: NEGATIVE MG/DL
HBA1C MFR BLD: 5.6 % (ref 0–5.6)
HCT VFR BLD AUTO: 47.7 % (ref 40–53)
HCV AB SERPL QL IA: NONREACTIVE
HDLC SERPL-MCNC: 52 MG/DL
HGB BLD-MCNC: 15.8 G/DL (ref 13.3–17.7)
HGB UR QL STRIP: ABNORMAL
INTERPRETATION ECG - MUSE: NORMAL
KETONES UR STRIP-MCNC: NEGATIVE MG/DL
LDLC SERPL CALC-MCNC: 79 MG/DL
LEUKOCYTE ESTERASE UR QL STRIP: NEGATIVE
MCH RBC QN AUTO: 30 PG (ref 26.5–33)
MCHC RBC AUTO-ENTMCNC: 33.1 G/DL (ref 31.5–36.5)
MCV RBC AUTO: 91 FL (ref 78–100)
MUCOUS THREADS #/AREA URNS LPF: PRESENT /LPF
NITRATE UR QL: NEGATIVE
NONHDLC SERPL-MCNC: 99 MG/DL
P AXIS - MUSE: 65 DEGREES
PH UR STRIP: 5.5 [PH] (ref 5–8)
PLATELET # BLD AUTO: 201 10E3/UL (ref 150–450)
POTASSIUM SERPL-SCNC: 5 MMOL/L (ref 3.4–5.3)
PR INTERVAL - MUSE: 172 MS
PROT SERPL-MCNC: 6.9 G/DL (ref 6.4–8.3)
PSA SERPL DL<=0.01 NG/ML-MCNC: 2.05 NG/ML (ref 0–6.5)
QRS DURATION - MUSE: 80 MS
QT - MUSE: 476 MS
QTC - MUSE: 416 MS
R AXIS - MUSE: 0 DEGREES
RBC # BLD AUTO: 5.26 10E6/UL (ref 4.4–5.9)
RBC #/AREA URNS AUTO: ABNORMAL /HPF
SODIUM SERPL-SCNC: 139 MMOL/L (ref 135–145)
SP GR UR STRIP: 1.02 (ref 1–1.03)
SQUAMOUS #/AREA URNS AUTO: ABNORMAL /LPF
SYSTOLIC BLOOD PRESSURE - MUSE: NORMAL MMHG
T AXIS - MUSE: 52 DEGREES
TRIGL SERPL-MCNC: 99 MG/DL
UROBILINOGEN UR STRIP-ACNC: 0.2 E.U./DL
VENTRICULAR RATE- MUSE: 46 BPM
VIT B12 SERPL-MCNC: 271 PG/ML (ref 232–1245)
WBC # BLD AUTO: 6.1 10E3/UL (ref 4–11)
WBC #/AREA URNS AUTO: ABNORMAL /HPF

## 2024-02-05 PROCEDURE — 86803 HEPATITIS C AB TEST: CPT | Performed by: INTERNAL MEDICINE

## 2024-02-05 PROCEDURE — G0404 EKG TRACING FOR INITIAL PREV: HCPCS | Performed by: INTERNAL MEDICINE

## 2024-02-05 PROCEDURE — 80053 COMPREHEN METABOLIC PANEL: CPT | Performed by: INTERNAL MEDICINE

## 2024-02-05 PROCEDURE — 85027 COMPLETE CBC AUTOMATED: CPT | Performed by: INTERNAL MEDICINE

## 2024-02-05 PROCEDURE — 82607 VITAMIN B-12: CPT | Performed by: INTERNAL MEDICINE

## 2024-02-05 PROCEDURE — G0008 ADMIN INFLUENZA VIRUS VAC: HCPCS | Performed by: INTERNAL MEDICINE

## 2024-02-05 PROCEDURE — 36415 COLL VENOUS BLD VENIPUNCTURE: CPT | Performed by: INTERNAL MEDICINE

## 2024-02-05 PROCEDURE — 99204 OFFICE O/P NEW MOD 45 MIN: CPT | Mod: 25 | Performed by: INTERNAL MEDICINE

## 2024-02-05 PROCEDURE — 93010 ELECTROCARDIOGRAM REPORT: CPT | Mod: OFF | Performed by: INTERNAL MEDICINE

## 2024-02-05 PROCEDURE — 90662 IIV NO PRSV INCREASED AG IM: CPT | Performed by: INTERNAL MEDICINE

## 2024-02-05 PROCEDURE — 81001 URINALYSIS AUTO W/SCOPE: CPT | Performed by: INTERNAL MEDICINE

## 2024-02-05 PROCEDURE — G0439 PPPS, SUBSEQ VISIT: HCPCS | Performed by: INTERNAL MEDICINE

## 2024-02-05 PROCEDURE — 83036 HEMOGLOBIN GLYCOSYLATED A1C: CPT | Performed by: INTERNAL MEDICINE

## 2024-02-05 PROCEDURE — 91320 SARSCV2 VAC 30MCG TRS-SUC IM: CPT | Performed by: INTERNAL MEDICINE

## 2024-02-05 PROCEDURE — 90480 ADMN SARSCOV2 VAC 1/ONLY CMP: CPT | Performed by: INTERNAL MEDICINE

## 2024-02-05 PROCEDURE — 80061 LIPID PANEL: CPT | Performed by: INTERNAL MEDICINE

## 2024-02-05 PROCEDURE — G0103 PSA SCREENING: HCPCS | Performed by: INTERNAL MEDICINE

## 2024-02-05 RX ORDER — RESPIRATORY SYNCYTIAL VIRUS VACCINE 120MCG/0.5
0.5 KIT INTRAMUSCULAR ONCE
Qty: 1 EACH | Refills: 0 | Status: CANCELLED | OUTPATIENT
Start: 2024-02-05 | End: 2024-02-05

## 2024-02-05 SDOH — HEALTH STABILITY: PHYSICAL HEALTH: ON AVERAGE, HOW MANY DAYS PER WEEK DO YOU ENGAGE IN MODERATE TO STRENUOUS EXERCISE (LIKE A BRISK WALK)?: 4 DAYS

## 2024-02-05 SDOH — HEALTH STABILITY: PHYSICAL HEALTH: ON AVERAGE, HOW MANY MINUTES DO YOU ENGAGE IN EXERCISE AT THIS LEVEL?: 60 MIN

## 2024-02-05 ASSESSMENT — SOCIAL DETERMINANTS OF HEALTH (SDOH): HOW OFTEN DO YOU GET TOGETHER WITH FRIENDS OR RELATIVES?: TWICE A WEEK

## 2024-02-05 ASSESSMENT — PAIN SCALES - GENERAL: PAINLEVEL: MODERATE PAIN (5)

## 2024-02-05 NOTE — PROGRESS NOTES
Preventive Care Visit  [facility]  Ish Gonzáles MD, Internal Medicine  Feb 5, 2024    1. Annual physical exam  This is a 78-year-old man with issues as discussed below    2. Need for hepatitis C screening test  - Hepatitis C Screen Reflex to HCV RNA Quant and Genotype; Future    3. Screening for prostate cancer  - Prostate Specific Antigen Screen; Future  - EKG 12-lead, tracing only    4. Hyperglycemia  - Hemoglobin A1c; Future    5. CAD, s/p DESx2 LCx 2015, no MI, Dr. MARGY Valdes  Continue secondary prevention, stress test as per Dr. Ish Valdes  - Echocardiogram Exercise Stress; Future  - CBC with platelets; Future  - Comprehensive metabolic panel; Future  - Lipid panel reflex to direct LDL Fasting; Future  - UA Macroscopic with reflex to Microscopic and Culture; Future  - EKG 12-lead, tracing only    6. Stenosis of right carotid artery  Continue secondary prevention asymptomatic    7. Pure hypercholesterolemia  Continue with statin    8. WESLEY on CPAP - 7cm H20  Needs a new machine, and the patient uses and benefits from CPAP  - CPAP Order for DME - ONLY FOR DME    9. Cervical stenosis of spinal canal  Stable follows at [facility]    10. Gastroesophageal reflux disease with esophagitis without hemorrhage  Start omeprazole  - omeprazole (PRILOSEC) 20 MG DR capsule; Take 1 capsule (20 mg) by mouth daily  Dispense: 90 capsule; Refill: 4    11. Aneurysm of ascending aorta without rupture (H24)  This needs ongoing evaluation, he will get an echocardiogram in the form of a stress test but if this is not looking at the aorta closely enough we may need to obtain an echocardiogram or chest imaging and he is also going to follow-up with Dr. Ish Valdes, last imaging 2021  - CBC with platelets; Future    12. Bradycardia  His vertigo seems to be positional and related to BPPV and has resolved, however his heart rate is quite slow and I like to obtain an EKG and a Holter monitor  - Echocardiogram  Exercise Stress; Future  - Holter Monitor 24 hour Adult Pediatric; Future    13. Vertigo  As above  - Holter Monitor 24 hour Adult Pediatric; Future    14. Polyneuropathy  B12 low normal previously also elevated A1c, will recheck these  - Vitamin B12; Future    15. Undiagnosed cardiac murmurs  Needs an echocardiogram, will get this in the form of a stress echocardiogram but may need a formal echocardiogram as well, following up with Dr. Ish Valdes  - [facility] with platelets; Future    Subjective   Wilfrid is a 78 year old, presenting for the following:  Wellness Visit (Hiatal hernia concerns, ear issues, recent dizzy spells. He is having acid reflux. Under left eye growth. Muscles are very tight. Issues with second toe on right foot. He would like to schedule a colonoscopy. Issues with his lower extremities.  He is having balance issues with many near falls. Chronic shoulder pain.)        2/5/2024     9:20 AM   Additional Questions   Roomed by Nuzhat ARRIAGA         Health Care Directive  Patient does not have a Health Care Directive or Living Will: Patient states has Advance Directive and will bring in a copy to clinic.    HPI  This 78-year-old man comes in for annual wellness visit.  He had some vertigo down to [place] when he was turning his head in bed.  He has had some neck pain and a stress test was ordered by Dr. Ish Valdes.  He also has been diagnosed with cervical spinal stenosis at [facility].  He has severe heartburn symptoms.  He has numbness in his feet.  He does not drink much alcohol.          2/5/2024   General Health   How would you rate your overall physical health? Good   Feel stress (tense, anxious, or unable to sleep) Only a little   (!) STRESS CONCERN      2/5/2024   Nutrition   Diet: Regular (no restrictions)         2/5/2024   Exercise   Days per week of moderate/strenous exercise 4 days   Average minutes spent exercising at this level 60 min         2/5/2024   Social Factors   Frequency of  gathering with friends or relatives Twice a week   Worry food won't last until get money to buy more No   Food not last or not have enough money for food? No   Do you have housing?  Yes   Are you worried about losing your housing? No   Lack of transportation? No   Unable to get utilities (heat,electricity)? No         2024   Fall Risk   Fallen 2 or more times in the past year? No   Trouble with walking or balance? Yes           2024   Activities of Daily Living- Home Safety   Needs help with the following daily activites None of the above   Safety concerns in the home No grab bars in the bathroom         2024   Dental   Dentist two times every year? (!) NO         2024   Hearing Screening   Hearing concerns? (!) I FEEL THAT PEOPLE ARE MUMBLING OR NOT SPEAKING CLEARLY.         2024   Driving Risk Screening   Patient/family members have concerns about driving No         2024   General Alertness/Fatigue Screening   Have you been more tired than usual lately? No         2024   Urinary Incontinence Screening   Bothered by leaking urine in past 6 months No          No data to display                  Today's PHQ-2 Score:       2024     9:20 AM   PHQ-2 ( 1999 [employer])   Q1: Little interest or pleasure in doing things 0   Q2: Feeling down, depressed or hopeless 0   PHQ-2 Score 0   Q1: Little interest or pleasure in doing things Not at all   Q2: Feeling down, depressed or hopeless Not at all   PHQ-2 Score 0           2024   Substance Use   Alcohol more than 3/day or more than 7/wk No   Do you have a current opioid prescription? No   How severe/bad is pain from 1 to 10? 5/10   Do you use any other substances recreationally? No     Social History     Tobacco Use    Smoking status: Former     Types: Cigarettes     Quit date: 10/22/2000     Years since quittin.3    Smokeless tobacco: Never   Substance Use Topics    Alcohol use: Yes     Comment: Alcoholic Drinks/day: 10 drinks/month    Drug  use: No       The 10-year ASCVD risk score (Leeann SCHAEFFER, et al., 2019) is: 27.4%    Values used to calculate the score:      Age: 78 years      Sex: Male      Is Non- : No      Diabetic: No      Tobacco smoker: No      Systolic Blood Pressure: 135 mmHg      Is BP treated: No      HDL Cholesterol: 51 mg/dL      Total Cholesterol: 137 mg/dL          Reviewed and updated as needed this visit by Provider      Problems                 Current providers sharing in care for this patient include:  Patient Care Team:  Ish Gonzáles MD as PCP - General (Internal Medicine)  Ish Valdes MD as Assigned Heart and Vascular Provider    The following health maintenance items are reviewed in Epic and correct as of today:  Health Maintenance   Topic Date Due    HEPATITIS C SCREENING  Never done    RSV VACCINE (Pregnancy & 60+) (1 - 1-dose 60+ series) Never done    ZOSTER IMMUNIZATION (2 of 3) 04/05/2017    LIPID  03/15/2024    ADVANCE CARE PLANNING  05/21/2024    MEDICARE ANNUAL WELLNESS VISIT  02/05/2025    ANNUAL REVIEW OF HM ORDERS  02/05/2025    FALL RISK ASSESSMENT  02/05/2025    DTAP/TDAP/TD IMMUNIZATION (2 - Td or Tdap) 11/04/2025    GLUCOSE  03/15/2026    COLORECTAL CANCER SCREENING  07/10/2029    PHQ-2 (once per calendar year)  Completed    INFLUENZA VACCINE  Completed    Pneumococcal Vaccine: 65+ Years  Completed    COVID-19 Vaccine  Completed    IPV IMMUNIZATION  Aged Out    HPV IMMUNIZATION  Aged Out    MENINGITIS IMMUNIZATION  Aged Out    RSV MONOCLONAL ANTIBODY  Aged Out    LUNG CANCER SCREENING  Discontinued     Review of Systems    Review of Systems  Constitutional, neuro, ENT, endocrine, pulmonary, cardiac, gastrointestinal, genitourinary, musculoskeletal, integument and psychiatric systems are negative, except as otherwise noted.     Objective    Exam  /79 (BP Location: Left arm, Patient Position: Sitting, Cuff Size: Adult Large)   Pulse (!) 46   Temp 97.9  F (36.6  C)  "(Tympanic)   Resp 16   Ht 1.854 m (6' 1\")   Wt 101.2 kg (223 lb)   SpO2 98%   BMI 29.42 kg/m     Estimated body mass index is 29.42 kg/m  as calculated from the following:    Height as of this encounter: 1.854 m (6' 1\").    Weight as of this encounter: 101.2 kg (223 lb).    Physical Exam  EYES: Eyelids, conjunctiva, and sclera were normal. Pupils were normal. Cornea, iris, and lens were normal bilaterally.  HEAD, EARS, NOSE, MOUTH, AND THROAT: Head and face were normal. Hearing was normal to voice and the ears were normal to external exam. Nose appearance was normal and there was no discharge.   NECK: Neck appearance was normal.   RESPIRATORY: Breathing pattern was normal and the chest moved symmetrically.  Percussion/auscultatory percussion was normal.  Lung sounds were normal and there were no abnormal sounds.  CARDIOVASCULAR: Bradycardic, rhythm regular.  S1 and S2 were normal and there was a 2 out of 6 upper sternal border systolic murmur. Peripheral pulses in arms and legs were normal.  Jugular venous pressure was normal.  There was no peripheral edema.  GASTROINTESTINAL: The abdomen was normal in contour.   NEUROLOGIC: The patient was alert and oriented to person, place, time, and circumstance. Speech was normal. Cranial nerves were normal. Motor strength was normal for age. The patient was normally coordinated.  PSYCHIATRIC:  Mood and affect were normal and the patient had normal recent and remote memory. The patient's judgment and insight were normal.            2/5/2024   Mini Cog   Clock Draw Score 2 Normal   3 Item Recall 3 objects recalled   Mini Cog Total Score 5            Signed Electronically by: Ish Gonzáles MD    "

## 2024-02-06 RX ORDER — EZETIMIBE 10 MG/1
10 TABLET ORAL DAILY
Qty: 90 TABLET | Refills: 4 | Status: SHIPPED | OUTPATIENT
Start: 2024-02-06

## 2024-02-06 RX ORDER — LANOLIN ALCOHOL/MO/W.PET/CERES
1000 CREAM (GRAM) TOPICAL DAILY
Qty: 90 TABLET | Refills: 4 | Status: SHIPPED | OUTPATIENT
Start: 2024-02-06

## 2024-02-09 DIAGNOSIS — G47.33 OBSTRUCTIVE SLEEP APNEA (ADULT) (PEDIATRIC): Primary | ICD-10-CM

## 2024-02-13 ENCOUNTER — HOSPITAL ENCOUNTER (OUTPATIENT)
Dept: CARDIOLOGY | Facility: CLINIC | Age: 79
Discharge: HOME | End: 2024-02-13
Attending: INTERNAL MEDICINE
Payer: MEDICARE

## 2024-02-13 DIAGNOSIS — R00.1 BRADYCARDIA: ICD-10-CM

## 2024-02-13 DIAGNOSIS — I25.10 ATHEROSCLEROSIS OF NATIVE CORONARY ARTERY OF NATIVE HEART WITHOUT ANGINA PECTORIS: ICD-10-CM

## 2024-02-13 PROCEDURE — 93016 CV STRESS TEST SUPVJ ONLY: CPT | Performed by: INTERNAL MEDICINE

## 2024-02-13 PROCEDURE — 93018 CV STRESS TEST I&R ONLY: CPT | Performed by: INTERNAL MEDICINE

## 2024-02-13 PROCEDURE — 93325 DOPPLER ECHO COLOR FLOW MAPG: CPT | Mod: 26 | Performed by: INTERNAL MEDICINE

## 2024-02-13 PROCEDURE — 255N000002 HC RX 255 OP 636: Performed by: INTERNAL MEDICINE

## 2024-02-13 PROCEDURE — 93352 ADMIN ECG CONTRAST AGENT: CPT | Performed by: INTERNAL MEDICINE

## 2024-02-13 PROCEDURE — 93350 STRESS TTE ONLY: CPT | Mod: 26 | Performed by: INTERNAL MEDICINE

## 2024-02-13 PROCEDURE — 93321 DOPPLER ECHO F-UP/LMTD STD: CPT | Mod: 26 | Performed by: INTERNAL MEDICINE

## 2024-02-13 RX ADMIN — PERFLUTREN 4 ML: 6.52 INJECTION, SUSPENSION INTRAVENOUS at 10:42

## 2024-02-14 DIAGNOSIS — R00.1 BRADYCARDIA: Primary | ICD-10-CM

## 2024-02-14 DIAGNOSIS — R94.31 ABNORMAL ELECTROCARDIOGRAM (ECG) (EKG): ICD-10-CM

## 2024-02-15 ENCOUNTER — HOSPITAL ENCOUNTER (OUTPATIENT)
Dept: CARDIOLOGY | Facility: CLINIC | Age: 79
Discharge: HOME | End: 2024-02-15
Attending: INTERNAL MEDICINE
Payer: MEDICARE

## 2024-02-15 PROCEDURE — 93225 XTRNL ECG REC<48 HRS REC: CPT

## 2024-02-16 ENCOUNTER — HOSPITAL ENCOUNTER (OUTPATIENT)
Dept: CARDIOLOGY | Facility: CLINIC | Age: 79
Discharge: HOME | End: 2024-02-16
Attending: INTERNAL MEDICINE
Payer: MEDICARE

## 2024-02-16 DIAGNOSIS — R94.31 ABNORMAL ELECTROCARDIOGRAM (ECG) (EKG): ICD-10-CM

## 2024-02-16 DIAGNOSIS — R00.1 BRADYCARDIA: ICD-10-CM

## 2024-02-16 LAB — LVEF ECHO: NORMAL

## 2024-02-16 PROCEDURE — 93306 TTE W/DOPPLER COMPLETE: CPT | Mod: 26 | Performed by: STUDENT IN AN ORGANIZED HEALTH CARE EDUCATION/TRAINING PROGRAM

## 2024-02-16 PROCEDURE — 93306 TTE W/DOPPLER COMPLETE: CPT

## 2024-02-16 PROCEDURE — 93225 XTRNL ECG REC<48 HRS REC: CPT

## 2024-02-22 PROCEDURE — 93227 XTRNL ECG REC<48 HR R&I: CPT | Performed by: INTERNAL MEDICINE

## 2024-03-29 ENCOUNTER — OFFICE VISIT (OUTPATIENT)
Dept: CARDIOLOGY | Facility: CLINIC | Age: 79
End: 2024-03-29
Payer: MEDICARE

## 2024-03-29 VITALS
BODY MASS INDEX: 30.34 KG/M2 | OXYGEN SATURATION: 94 % | HEART RATE: 58 BPM | DIASTOLIC BLOOD PRESSURE: 70 MMHG | RESPIRATION RATE: 20 BRPM | SYSTOLIC BLOOD PRESSURE: 140 MMHG | WEIGHT: 230 LBS

## 2024-03-29 DIAGNOSIS — I65.21 STENOSIS OF RIGHT CAROTID ARTERY: ICD-10-CM

## 2024-03-29 DIAGNOSIS — I79.0 ANEURYSM OF AORTA IN DISEASES CLASSIFIED ELSEWHERE (H): ICD-10-CM

## 2024-03-29 DIAGNOSIS — E78.00 PURE HYPERCHOLESTEROLEMIA: Primary | ICD-10-CM

## 2024-03-29 DIAGNOSIS — I77.89 ASCENDING AORTA ENLARGEMENT (H): ICD-10-CM

## 2024-03-29 DIAGNOSIS — I25.10 ATHEROSCLEROSIS OF NATIVE CORONARY ARTERY OF NATIVE HEART WITHOUT ANGINA PECTORIS: ICD-10-CM

## 2024-03-29 PROBLEM — I71.21 ASCENDING AORTIC ANEURYSM (H): Status: RESOLVED | Noted: 2024-02-05 | Resolved: 2024-03-29

## 2024-03-29 PROCEDURE — G2211 COMPLEX E/M VISIT ADD ON: HCPCS | Performed by: INTERNAL MEDICINE

## 2024-03-29 PROCEDURE — 99214 OFFICE O/P EST MOD 30 MIN: CPT | Performed by: INTERNAL MEDICINE

## 2024-03-29 NOTE — PROGRESS NOTES
[facility]  [phone]          Assessment/Recommendations   Patient with known coronary artery disease, status post distant history of percutaneous stent placement.  Who also has some mild enlargement of his ascending aorta, mild aortic stenosis and on recent Holter monitor brief SVT.    He has not been exercising of late and will be getting back to bicycling and more exercise in the very near future.  Stress test was unremarkable.  Holter monitor is not worrisome at all with brief SVT.  He does need a follow-up carotid ultrasound and evaluation of his ascending aorta and we will get a carotid ultrasound as well as a chest MRI to evaluate his ascending aorta.  We will get back to him with these results.    He is on a good medical regimen with significant reduction in his LDL cholesterol, takes an antiplatelet agent,, and blood pressures generally run better than today.    Will plan on getting back to him with the results of the above testing and likely see him in follow-up in 1 year, but of course would be happy to see him sooner if questions or problems arise.       History of Present Illness/Subjective    Mr. Wilfrid Wyman is a 78 year old male with known coronary artery disease with distant history of stents in his coronary arteries.  He also has enlargement of the ascending aorta and recent echocardiogram showed stability of mild aortic stenosis.  He has some moderate right carotid artery stenosis by ultrasound a few years ago.    He is feeling well but admits to being more sedentary and not exercising.  He feels a little fatigued because of this.  He did reasonably well on the stress echocardiogram without evidence of ischemia which is comforting.  He denies orthopnea, paroxysmal nocturnal dyspnea, peripheral edema, syncope or near syncopal episodes.  He did have an episode of vertigo in [place] when he did some exercises which seem to take it away.  Holter monitor showed some  brief SVT.  He is bradycardic while sleeping.    ECG: Personally reviewed.        Physical Examination Review of Systems   BP (!) 140/70 (BP Location: Right arm, Patient Position: Sitting, Cuff Size: Adult Large)   Pulse 58   Resp 20   Wt 104.3 kg (230 lb)   SpO2 94%   BMI 30.34 kg/m    Body mass index is 30.34 kg/m .  Wt Readings from Last 3 Encounters:   24 104.3 kg (230 lb)   24 101.2 kg (223 lb)   03/15/23 104.2 kg (229 lb 11.2 oz)     General Appearance:   Alert, cooperative and in no acute distress.   ENT/Mouth: Pink/moist oral mucosa   EYES:  no scleral icterus, normal conjunctivae   Neck: JVP normal. No Hepatojugular reflux. Thyroid not visualized.   Chest/Lungs:   Lungs are clear to auscultation, equal chest wall expansion.   Cardiovascular:   S1, S2 with 2/6 systolic murmur ,clicks or rubs. Brachial, radial and posterior tibial pulses are intact and symetric. No carotid bruits noted   Abdomen:  Nontender. BS+   Extremities: No cyanosis, clubbing or edema   Skin: no xanthelasma, warm.    Neurologic: normal arm movement bilateral, no tremors     Psychiatric: Appropriate affect.      Enc Vitals  BP: (!) 140/70  Pulse: 58  Resp: 20  SpO2: 94 %  Weight: 104.3 kg (230 lb)                                           Medical History  Surgical History Family History Social History   Past Medical History:   Diagnosis Date    Carotid artery occlusion     Coronary artery disease     ED (erectile dysfunction)     Family history of myocardial infarction     father 60    GERD (gastroesophageal reflux disease)     High cholesterol     Hyperlipidemia     WESLEY on CPAP     Past Surgical History:   Procedure Laterality Date    ARTHROSCOPY KNEE Bilateral     CARDIAC CATHETERIZATION  11/24/15    stent x 2    CORONARY STENT PLACEMENT      Family History   Problem Relation Age of Onset    Valvular heart disease Mother 70.00        bicuspid    Cerebrovascular Disease Mother 75.00         age 82    Acute  Myocardial Infarction Father 60.00    Other - See Comments Brother          [place]    Other - See Comments Sister         does not know history    Obesity Daughter     No Known Problems Daughter     No Known Problems Son     Social History     Socioeconomic History    Marital status:      Spouse name: Not on file    Number of children: Not on file    Years of education: Not on file    Highest education level: Not on file   Occupational History    Not on file   Tobacco Use    Smoking status: Former     Types: Cigarettes     Quit date: 10/22/2000     Years since quittin.4    Smokeless tobacco: Never   Substance and Sexual Activity    Alcohol use: Yes     Comment: Alcoholic Drinks/day: 10 drinks/month    Drug use: No    Sexual activity: Not on file   Other Topics Concern    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       Social Determinants of Health     Financial Resource Strain: Low Risk  (2024)    Financial Resource Strain     Within the past 12 months, have you or your family members you live with been unable to get utilities (heat, electricity) when it was really needed?: No   Food Insecurity: Low Risk  (2024)    Food Insecurity     Within the past 12 months, did you worry that your food would run out before you got money to buy more?: No     Within the past 12 months, did the food you bought just not last and you didn t have money to get more?: No   Transportation Needs: Low Risk  (2024)    Transportation Needs     Within the past 12 months, has lack of transportation kept you from medical appointments, getting your medicines, non-medical meetings or appointments, work, or from getting things that you need?: No   Physical Activity: Sufficiently Active (2024)    Exercise Vital Sign     Days of Exercise per Week: 4 days     Minutes of Exercise per Session: 60 min   Stress: No Stress Concern Present (2024)    [facility] of  Occupational Health - Occupational Stress Questionnaire     Feeling of Stress : Only a little   Social Connections: Unknown (2/5/2024)    Social Connection and Isolation Panel [NHANES]     Frequency of Communication with Friends and Family: Not on file     Frequency of Social Gatherings with Friends and Family: Twice a week     Attends Confucianism Services: Not on file     Active Member of Clubs or Organizations: Not on file     Attends Club or Organization Meetings: Not on file     Marital Status: Not on file   Interpersonal Safety: Low Risk  (2/5/2024)    Interpersonal Safety     Do you feel physically and emotionally safe where you currently live?: Yes     Within the past 12 months, have you been hit, slapped, kicked or otherwise physically hurt by someone?: No     Within the past 12 months, have you been humiliated or emotionally abused in other ways by your partner or ex-partner?: No   Housing Stability: Low Risk  (2/5/2024)    Housing Stability     Do you have housing? : Yes     Are you worried about losing your housing?: No          Medications  Allergies   Current Outpatient Medications   Medication Sig Dispense Refill    aspirin 81 MG EC tablet [ASPIRIN 81 MG EC TABLET] Take 81 mg by mouth daily.      atorvastatin (LIPITOR) 80 MG tablet Take 1 tablet (80 mg) by mouth At Bedtime 90 tablet 4    miscellaneous medical supply Misc [MISCELLANEOUS MEDICAL SUPPLY MIS] CPAP machine for home use at pressure: 7cmw,  nasal mask with cushion x 1,      cyanocobalamin (VITAMIN B-12) 1000 MCG tablet Take 1 tablet (1,000 mcg) by mouth daily (Patient not taking: Reported on 3/29/2024) 90 tablet 4    ezetimibe (ZETIA) 10 MG tablet Take 1 tablet (10 mg) by mouth daily (Patient not taking: Reported on 3/29/2024) 90 tablet 4    omeprazole (PRILOSEC) 20 MG DR capsule Take 1 capsule (20 mg) by mouth daily (Patient not taking: Reported on 3/29/2024) 90 capsule 4    sildenafiL (VIAGRA) 50 MG tablet [SILDENAFIL (VIAGRA) 50 MG TABLET]  Take 1 tablet (50 mg total) by mouth daily as needed for erectile dysfunction. (Patient not taking: Reported on 3/29/2024) 10 tablet 5    Allergies   Allergen Reactions    Penicillins Rash         Lab Results    Chemistry/lipid CBC Cardiac Enzymes/BNP/TSH/INR   Lab Results   Component Value Date    CHOL 151 02/05/2024    HDL 52 02/05/2024    TRIG 99 02/05/2024    BUN 16.7 02/05/2024     02/05/2024    CO2 25 02/05/2024    Lab Results   Component Value Date    WBC 6.1 02/05/2024    HGB 15.8 02/05/2024    HCT 47.7 02/05/2024    MCV 91 02/05/2024     02/05/2024    Lab Results   Component Value Date    TSH 1.20 05/21/2019

## 2024-03-29 NOTE — LETTER
3/29/2024    Ish Gonzáles MD  [address] [facility] [address] [facility] [address]    RE: Wilfrid Wyman       Dear Colleague,     I had the pleasure of seeing Wilfrid Wyman in the [facility].      [facility]  [phone]          Assessment/Recommendations   Patient with known coronary artery disease, status post distant history of percutaneous stent placement.  Who also has some mild enlargement of his ascending aorta, mild aortic stenosis and on recent Holter monitor brief SVT.    He has not been exercising of late and will be getting back to bicycling and more exercise in the very near future.  Stress test was unremarkable.  Holter monitor is not worrisome at all with brief SVT.  He does need a follow-up carotid ultrasound and evaluation of his ascending aorta and we will get a carotid ultrasound as well as a chest MRI to evaluate his ascending aorta.  We will get back to him with these results.    He is on a good medical regimen with significant reduction in his LDL cholesterol, takes an antiplatelet agent,, and blood pressures generally run better than today.    Will plan on getting back to him with the results of the above testing and likely see him in follow-up in 1 year, but of course would be happy to see him sooner if questions or problems arise.       History of Present Illness/Subjective    Mr. Wilfrid Wyman is a 78 year old male with known coronary artery disease with distant history of stents in his coronary arteries.  He also has enlargement of the ascending aorta and recent echocardiogram showed stability of mild aortic stenosis.  He has some moderate right carotid artery stenosis by ultrasound a few years ago.    He is feeling well but admits to being more sedentary and not exercising.  He feels a little fatigued because of this.  He did reasonably well on the stress echocardiogram without evidence of ischemia which is comforting.  He denies orthopnea,  paroxysmal nocturnal dyspnea, peripheral edema, syncope or near syncopal episodes.  He did have an episode of vertigo in [place] when he did some exercises which seem to take it away.  Holter monitor showed some brief SVT.  He is bradycardic while sleeping.    ECG: Personally reviewed.        Physical Examination Review of Systems   BP (!) 140/70 (BP Location: Right arm, Patient Position: Sitting, Cuff Size: Adult Large)   Pulse 58   Resp 20   Wt 104.3 kg (230 lb)   SpO2 94%   BMI 30.34 kg/m    Body mass index is 30.34 kg/m .  Wt Readings from Last 3 Encounters:   03/29/24 104.3 kg (230 lb)   02/05/24 101.2 kg (223 lb)   03/15/23 104.2 kg (229 lb 11.2 oz)     General Appearance:   Alert, cooperative and in no acute distress.   ENT/Mouth: Pink/moist oral mucosa   EYES:  no scleral icterus, normal conjunctivae   Neck: JVP normal. No Hepatojugular reflux. Thyroid not visualized.   Chest/Lungs:   Lungs are clear to auscultation, equal chest wall expansion.   Cardiovascular:   S1, S2 with 2/6 systolic murmur ,clicks or rubs. Brachial, radial and posterior tibial pulses are intact and symetric. No carotid bruits noted   Abdomen:  Nontender. BS+   Extremities: No cyanosis, clubbing or edema   Skin: no xanthelasma, warm.    Neurologic: normal arm movement bilateral, no tremors     Psychiatric: Appropriate affect.      Enc Vitals  BP: (!) 140/70  Pulse: 58  Resp: 20  SpO2: 94 %  Weight: 104.3 kg (230 lb)                                           Medical History  Surgical History Family History Social History   Past Medical History:   Diagnosis Date    Carotid artery occlusion     Coronary artery disease     ED (erectile dysfunction)     Family history of myocardial infarction     father 60    GERD (gastroesophageal reflux disease)     High cholesterol     Hyperlipidemia     WESLEY on CPAP     Past Surgical History:   Procedure Laterality Date    ARTHROSCOPY KNEE Bilateral     CARDIAC CATHETERIZATION  11/24/15    stent x 2     CORONARY STENT PLACEMENT      Family History   Problem Relation Age of Onset    Valvular heart disease Mother 70.00        bicuspid    Cerebrovascular Disease Mother 75.00         age 82    Acute Myocardial Infarction Father 60.00    Other - See Comments Brother          [place]    Other - See Comments Sister         does not know history    Obesity Daughter     No Known Problems Daughter     No Known Problems Son     Social History     Socioeconomic History    Marital status:      Spouse name: Not on file    Number of children: Not on file    Years of education: Not on file    Highest education level: Not on file   Occupational History    Not on file   Tobacco Use    Smoking status: Former     Types: Cigarettes     Quit date: 10/22/2000     Years since quittin.4    Smokeless tobacco: Never   Substance and Sexual Activity    Alcohol use: Yes     Comment: Alcoholic Drinks/day: 10 drinks/month    Drug use: No    Sexual activity: Not on file   Other Topics Concern    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       Social Determinants of Health     Financial Resource Strain: Low Risk  (2024)    Financial Resource Strain     Within the past 12 months, have you or your family members you live with been unable to get utilities (heat, electricity) when it was really needed?: No   Food Insecurity: Low Risk  (2024)    Food Insecurity     Within the past 12 months, did you worry that your food would run out before you got money to buy more?: No     Within the past 12 months, did the food you bought just not last and you didn t have money to get more?: No   Transportation Needs: Low Risk  (2024)    Transportation Needs     Within the past 12 months, has lack of transportation kept you from medical appointments, getting your medicines, non-medical meetings or appointments, work, or from getting things that you need?: No   Physical Activity:  Sufficiently Active (2/5/2024)    Exercise Vital Sign     Days of Exercise per Week: 4 days     Minutes of Exercise per Session: 60 min   Stress: No Stress Concern Present (2/5/2024)    [employer] - Occupational Stress Questionnaire     Feeling of Stress : Only a little   Social Connections: Unknown (2/5/2024)    Social Connection and Isolation Panel [NHANES]     Frequency of Communication with Friends and Family: Not on file     Frequency of Social Gatherings with Friends and Family: Twice a week     Attends Advent Services: Not on file     Active Member of Clubs or Organizations: Not on file     Attends Club or Organization Meetings: Not on file     Marital Status: Not on file   Interpersonal Safety: Low Risk  (2/5/2024)    Interpersonal Safety     Do you feel physically and emotionally safe where you currently live?: Yes     Within the past 12 months, have you been hit, slapped, kicked or otherwise physically hurt by someone?: No     Within the past 12 months, have you been humiliated or emotionally abused in other ways by your partner or ex-partner?: No   Housing Stability: Low Risk  (2/5/2024)    Housing Stability     Do you have housing? : Yes     Are you worried about losing your housing?: No          Medications  Allergies   Current Outpatient Medications   Medication Sig Dispense Refill    aspirin 81 MG EC tablet [ASPIRIN 81 MG EC TABLET] Take 81 mg by mouth daily.      atorvastatin (LIPITOR) 80 MG tablet Take 1 tablet (80 mg) by mouth At Bedtime 90 tablet 4    miscellaneous medical supply Misc [MISCELLANEOUS MEDICAL SUPPLY [facility]] CPAP machine for home use at pressure: 7cmw,  nasal mask with cushion x 1,      cyanocobalamin (VITAMIN B-12) 1000 MCG tablet Take 1 tablet (1,000 mcg) by mouth daily (Patient not taking: Reported on 3/29/2024) 90 tablet 4    ezetimibe (ZETIA) 10 MG tablet Take 1 tablet (10 mg) by mouth daily (Patient not taking: Reported on 3/29/2024) 90 tablet 4     omeprazole (PRILOSEC) 20 MG DR capsule Take 1 capsule (20 mg) by mouth daily (Patient not taking: Reported on 3/29/2024) 90 capsule 4    sildenafiL (VIAGRA) 50 MG tablet [SILDENAFIL (VIAGRA) 50 MG TABLET] Take 1 tablet (50 mg total) by mouth daily as needed for erectile dysfunction. (Patient not taking: Reported on 3/29/2024) 10 tablet 5    Allergies   Allergen Reactions    Penicillins Rash         Lab Results    Chemistry/lipid CBC Cardiac Enzymes/BNP/TSH/INR   Lab Results   Component Value Date    CHOL 151 02/05/2024    HDL 52 02/05/2024    TRIG 99 02/05/2024    BUN 16.7 02/05/2024     02/05/2024    CO2 25 02/05/2024    Lab Results   Component Value Date    WBC 6.1 02/05/2024    HGB 15.8 02/05/2024    HCT 47.7 02/05/2024    MCV 91 02/05/2024     02/05/2024    Lab Results   Component Value Date    TSH 1.20 05/21/2019                    Thank you for allowing me to participate in the care of your patient.      Sincerely,     Ish Valdes MD     [facility]  cc:   No referring provider defined for this encounter.

## 2024-04-18 ENCOUNTER — HOSPITAL ENCOUNTER (OUTPATIENT)
Dept: ULTRASOUND IMAGING | Facility: CLINIC | Age: 79
Discharge: HOME | End: 2024-04-18
Attending: INTERNAL MEDICINE | Admitting: INTERNAL MEDICINE
Payer: MEDICARE

## 2024-04-18 DIAGNOSIS — E78.00 PURE HYPERCHOLESTEROLEMIA: ICD-10-CM

## 2024-04-18 DIAGNOSIS — I25.10 ATHEROSCLEROSIS OF NATIVE CORONARY ARTERY OF NATIVE HEART WITHOUT ANGINA PECTORIS: ICD-10-CM

## 2024-04-18 DIAGNOSIS — I77.89 ASCENDING AORTA ENLARGEMENT (H): ICD-10-CM

## 2024-04-18 DIAGNOSIS — I65.21 STENOSIS OF RIGHT CAROTID ARTERY: ICD-10-CM

## 2024-04-18 PROCEDURE — 93880 EXTRACRANIAL BILAT STUDY: CPT

## 2024-04-24 ENCOUNTER — HOSPITAL ENCOUNTER (OUTPATIENT)
Dept: MRI IMAGING | Facility: HOSPITAL | Age: 79
Discharge: HOME | End: 2024-04-24
Attending: INTERNAL MEDICINE
Payer: MEDICARE

## 2024-04-24 DIAGNOSIS — I79.0 ANEURYSM OF AORTA IN DISEASES CLASSIFIED ELSEWHERE (H): ICD-10-CM

## 2024-04-24 DIAGNOSIS — I77.89 ASCENDING AORTA ENLARGEMENT (H): ICD-10-CM

## 2024-04-24 PROCEDURE — 255N000002 HC RX 255 OP 636: Performed by: INTERNAL MEDICINE

## 2024-04-24 PROCEDURE — G1010 CDSM STANSON: HCPCS

## 2024-04-24 PROCEDURE — 71552 MRI CHEST W/O & W/DYE: CPT

## 2024-04-24 PROCEDURE — A9585 GADOBUTROL INJECTION: HCPCS | Performed by: INTERNAL MEDICINE

## 2024-04-24 RX ORDER — GADOBUTROL 604.72 MG/ML
15 INJECTION INTRAVENOUS ONCE
Status: COMPLETED | OUTPATIENT
Start: 2024-04-24 | End: 2024-04-24

## 2024-04-24 RX ADMIN — GADOBUTROL 15 ML: 604.72 INJECTION INTRAVENOUS at 07:08

## 2024-10-10 DIAGNOSIS — I25.10 CAD (CORONARY ARTERY DISEASE): ICD-10-CM

## 2024-10-11 RX ORDER — ATORVASTATIN CALCIUM 80 MG/1
80 TABLET, FILM COATED ORAL AT BEDTIME
Qty: 90 TABLET | Refills: 0 | Status: SHIPPED | OUTPATIENT
Start: 2024-10-11

## 2025-01-16 DIAGNOSIS — I25.10 CAD (CORONARY ARTERY DISEASE): ICD-10-CM

## 2025-01-16 RX ORDER — ATORVASTATIN CALCIUM 80 MG/1
80 TABLET, FILM COATED ORAL AT BEDTIME
Qty: 90 TABLET | Refills: 0 | Status: SHIPPED | OUTPATIENT
Start: 2025-01-16

## 2025-02-04 ENCOUNTER — PATIENT OUTREACH (OUTPATIENT)
Dept: CARE COORDINATION | Facility: CLINIC | Age: 80
End: 2025-02-04
Payer: MEDICARE

## 2025-03-15 ENCOUNTER — HEALTH MAINTENANCE LETTER (OUTPATIENT)
Age: 80
End: 2025-03-15

## 2025-03-21 PROBLEM — I35.0 MILD AORTIC STENOSIS: Status: ACTIVE | Noted: 2025-03-21

## 2025-04-08 ENCOUNTER — THERAPY VISIT (OUTPATIENT)
Dept: PHYSICAL THERAPY | Facility: CLINIC | Age: 80
End: 2025-04-08
Attending: INTERNAL MEDICINE
Payer: MEDICARE

## 2025-04-08 DIAGNOSIS — G89.29 CHRONIC LEFT SHOULDER PAIN: ICD-10-CM

## 2025-04-08 DIAGNOSIS — G89.29 CHRONIC PAIN OF BOTH SHOULDERS: Primary | ICD-10-CM

## 2025-04-08 DIAGNOSIS — M25.512 CHRONIC LEFT SHOULDER PAIN: ICD-10-CM

## 2025-04-08 DIAGNOSIS — M25.511 CHRONIC PAIN OF BOTH SHOULDERS: Primary | ICD-10-CM

## 2025-04-08 DIAGNOSIS — M25.512 CHRONIC PAIN OF BOTH SHOULDERS: Primary | ICD-10-CM

## 2025-04-08 PROCEDURE — 97110 THERAPEUTIC EXERCISES: CPT | Mod: GP | Performed by: PHYSICAL THERAPIST

## 2025-04-08 PROCEDURE — 97161 PT EVAL LOW COMPLEX 20 MIN: CPT | Mod: GP | Performed by: PHYSICAL THERAPIST

## 2025-04-08 ASSESSMENT — ACTIVITIES OF DAILY LIVING (ADL)
PUSHING_WITH_THE_INVOLVED_ARM: 7
AT_ITS_WORST?: 5
PUTTING_ON_YOUR_PANTS: 3
TOUCHING_THE_BACK_OF_YOUR_NECK: 4
PUTTING_ON_A_SHIRT_THAT_BUTTONS_DOWN_THE_FRONT: 7
WASHING_YOUR_BACK: 9
WASHING_YOUR_HAIR?: 5
PLACING_AN_OBJECT_ON_A_HIGH_SHELF: 8
CARRYING_A_HEAVY_OBJECT_OF_10_POUNDS: 3
PUTTING_ON_AN_UNDERSHIRT_OR_A_PULLOVER_SWEATER: 8
REACHING_FOR_SOMETHING_ON_A_HIGH_SHELF: 7
REMOVING_SOMETHING_FROM_YOUR_BACK_POCKET: 8
PLEASE_INDICATE_YOR_PRIMARY_REASON_FOR_REFERRAL_TO_THERAPY:: SHOULDER

## 2025-04-08 NOTE — PROGRESS NOTES
PHYSICAL THERAPY EVALUATION  Type of Visit: Evaluation             Subjective         Presenting condition or subjective complaint: shoulder pain and general stiffness. Pt reports having a chronic history of B shoulder stiffness and pain associated with OA for many years. Pt has been managing his pain and stiffness with strengthening exercises but reports gradual worsening over the past year. Pt reports increased difficulty with dressing due to shoulder pain and stiffness. Pt has a secondary history of cervical pain and stenosis and reports being offered surgery for both cervical and shoulder.   Date of onset: 03/21/25    Relevant medical history:     Dates & types of surgery: stints over five years    Prior diagnostic imaging/testing results: MRI; CT scan; X-ray; EMG; Bone scan     Prior therapy history for the same diagnosis, illness or injury: No      Living Environment  Social support: Alone   Type of home:     Stairs to enter the home: Yes 25 Is there a railing: Yes     Ramp: No   Stairs inside the home: No       Help at home: None  Equipment owned:       Employment: No    Hobbies/Interests: music    Patient goals for therapy: dress coat shirts    Pain assessment: See objective evaluation for additional pain details     Objective   SHOULDER EVALUATION  PAIN: Pain Level at Rest: 3/10  Pain Level with Use: 8/10  Pain Location: B lateral shoulder  Pain Quality: Aching and Dull  Pain Frequency: constant  Pain is Worst: nighttime  Pain is Exacerbated By: lifting, reaching  Pain is Relieved By: cold and stretch  Pain Progression: Worsened  INTEGUMENTARY (edema, incisions):   POSTURE: Sitting Posture: Rounded shoulders  GAIT:   Weightbearing Status:   Assistive Device(s):   Gait Deviations:   BALANCE/PROPRIOCEPTION:   WEIGHTBEARING ALIGNMENT:   ROM:   (Degrees) Left AROM Left PROM Right AROM  Right PROM   Shoulder Flexion 80 90 80 100   Shoulder Extension       Shoulder Abduction 80 90 80 90   Shoulder Adduction        Shoulder Internal Rotation Thumb to greater trochanter  Thumb to greater trochanter    Shoulder External Rotation 0  0    Shoulder Horizontal Abduction       Shoulder Horizontal Adduction       Shoulder Flexion ER       Shoulder Flexion IR       Elbow Extension       Elbow Flexion       Pain:   End feel:     STRENGTH:   Pain: - none + mild ++ moderate +++ severe  Strength Scale: 0-5/5 Left Right   Shoulder Flexion 4+ 4+   Shoulder Extension     Shoulder Abduction 4 4   Shoulder Adduction     Shoulder Internal Rotation 5 5   Shoulder External Rotation 5 5   Shoulder Horizontal Abduction     Shoulder Horizontal Adduction     Elbow Flexion     Elbow Extension     Mid Trap     Lower Trap     Rhomboid     Serratus Anterior       FLEXIBILITY: Decreased pectoralis major L, Decreased pectoralis minor L, Decreased pectoralis major R, Decreased pectoralis minor R  SPECIAL TESTS:   PALPATION:   JOINT MOBILITY:  hypomobile GH jts  CERVICAL SCREEN:  Mod loss of cervical SB and rotation    Assessment & Plan   CLINICAL IMPRESSIONS  Medical Diagnosis: B shoulder pain    Treatment Diagnosis: B shoulder pain and stiffness   Impression/Assessment: Patient is a 79 year old male with B shoulder complaints.  The following significant findings have been identified: Pain, Decreased ROM/flexibility, Decreased joint mobility, Decreased strength, Impaired muscle performance, and Impaired posture. These impairments interfere with their ability to perform self care tasks, work tasks, recreational activities, and household chores as compared to previous level of function.     Clinical Decision Making (Complexity):  Clinical Presentation: Stable/Uncomplicated  Clinical Presentation Rationale: based on medical and personal factors listed in PT evaluation  Clinical Decision Making (Complexity): Low complexity    PLAN OF CARE  Treatment Interventions:  Interventions: Gait Training, Manual Therapy, Neuromuscular Re-education, Therapeutic Activity,  Therapeutic Exercise    Long Term Goals     PT Goal 1  Goal Identifier: Reaching  Goal Description: Pt will be able to reach behind back to belt level w/ pain 3/10 or less for dressing  Rationale: to maximize safety and independence with performance of ADLs and functional tasks;to maximize safety and independence with self cares  Goal Progress: thumb to gluteal fold  Target Date: 07/01/25      Frequency of Treatment: 1x week  Duration of Treatment: 4 weeks then 2 x month for 8 weeks    Recommended Referrals to Other Professionals:   Education Assessment:   Learner/Method: Patient;No Barriers to Learning    Risks and benefits of evaluation/treatment have been explained.   Patient/Family/caregiver agrees with Plan of Care.     Evaluation Time:     PT Eval, Low Complexity Minutes (78258): 25       Signing Clinician: Ferimn Ferrera PT        [facility]                                                                                   OUTPATIENT PHYSICAL THERAPY      PLAN OF TREATMENT FOR OUTPATIENT REHABILITATION   Patient's Last Name, First Name, Wilfrid Quintana YOB: 1945   Provider's Name   [facility]   Medical Record No.  [id number]     Onset Date: 03/21/25  Start of Care Date: 04/08/25     Medical Diagnosis:  B shoulder pain      PT Treatment Diagnosis:  B shoulder pain and stiffness Plan of Treatment  Frequency/Duration: 1x week/ 4 weeks then 2 x month for 8 weeks    Certification date from 04/08/25 to 07/01/25         See note for plan of treatment details and functional goals     Fermin Ferrera, PT                         I CERTIFY THE NEED FOR THESE SERVICES FURNISHED UNDER        THIS PLAN OF TREATMENT AND WHILE UNDER MY CARE     (Physician attestation of this document indicates review and certification of the therapy plan).              Referring Provider:  Ish Gonzáles    Initial Assessment  See Epic Evaluation- Start of  Care Date: 04/08/25

## 2025-04-20 DIAGNOSIS — I25.10 CAD (CORONARY ARTERY DISEASE): ICD-10-CM

## 2025-04-21 RX ORDER — ATORVASTATIN CALCIUM 80 MG/1
80 TABLET, FILM COATED ORAL AT BEDTIME
Qty: 90 TABLET | Refills: 2 | Status: SHIPPED | OUTPATIENT
Start: 2025-04-21